# Patient Record
Sex: MALE | Race: BLACK OR AFRICAN AMERICAN | NOT HISPANIC OR LATINO | Employment: FULL TIME | URBAN - METROPOLITAN AREA
[De-identification: names, ages, dates, MRNs, and addresses within clinical notes are randomized per-mention and may not be internally consistent; named-entity substitution may affect disease eponyms.]

---

## 2020-05-11 ENCOUNTER — HISTORICAL (OUTPATIENT)
Dept: RADIOLOGY | Facility: HOSPITAL | Age: 50
End: 2020-05-11

## 2022-01-18 ENCOUNTER — HISTORICAL (OUTPATIENT)
Dept: SURGERY | Facility: HOSPITAL | Age: 52
End: 2022-01-18

## 2022-01-18 LAB
ABS NEUT (OLG): 5.6 X10(3)/MCL (ref 1.5–6.9)
ALBUMIN SERPL-MCNC: 4 GM/DL (ref 3.5–5)
ALBUMIN/GLOB SERPL: 1.2 RATIO (ref 1.1–2)
ALP SERPL-CCNC: 100 UNIT/L (ref 40–150)
ALT SERPL-CCNC: 39 UNIT/L (ref 0–55)
APTT PPP: 29.8 SEC (ref 23.4–34.9)
AST SERPL-CCNC: 27 UNIT/L (ref 5–34)
BASOPHILS # BLD AUTO: 0.1 X10(3)/MCL (ref 0–0.1)
BASOPHILS NFR BLD AUTO: 1 % (ref 0–1)
BILIRUB SERPL-MCNC: 0.4 MG/DL
BILIRUBIN DIRECT+TOT PNL SERPL-MCNC: 0.2 MG/DL (ref 0–0.5)
BILIRUBIN DIRECT+TOT PNL SERPL-MCNC: 0.2 MG/DL (ref 0–0.8)
BUN SERPL-MCNC: 12 MG/DL (ref 8.4–25.7)
CALCIUM SERPL-MCNC: 10 MG/DL (ref 8.7–10.5)
CHLORIDE SERPL-SCNC: 100 MMOL/L (ref 98–107)
CO2 SERPL-SCNC: 34 MMOL/L (ref 22–29)
CREAT SERPL-MCNC: 1.02 MG/DL (ref 0.73–1.18)
EOSINOPHIL # BLD AUTO: 0.5 X10(3)/MCL (ref 0–0.6)
EOSINOPHIL NFR BLD AUTO: 5 % (ref 0–5)
ERYTHROCYTE [DISTWIDTH] IN BLOOD BY AUTOMATED COUNT: 14 % (ref 11.5–17)
GLOBULIN SER-MCNC: 3.2 GM/DL (ref 2.4–3.5)
GLUCOSE SERPL-MCNC: 79 MG/DL (ref 74–100)
HCT VFR BLD AUTO: 46.4 % (ref 42–52)
HGB BLD-MCNC: 14.8 GM/DL (ref 14–18)
IMM GRANULOCYTES # BLD AUTO: 0.03 10*3/UL (ref 0–0.02)
IMM GRANULOCYTES NFR BLD AUTO: 0.3 % (ref 0–0.43)
INR PPP: 1 (ref 2–3)
LYMPHOCYTES # BLD AUTO: 1.9 X10(3)/MCL (ref 0.5–4.1)
LYMPHOCYTES NFR BLD AUTO: 22 % (ref 15–40)
MCH RBC QN AUTO: 27 PG (ref 27–34)
MCHC RBC AUTO-ENTMCNC: 32 GM/DL (ref 31–36)
MCV RBC AUTO: 85 FL (ref 80–99)
MONOCYTES # BLD AUTO: 0.8 X10(3)/MCL (ref 0–1.1)
MONOCYTES NFR BLD AUTO: 9 % (ref 4–12)
NEUTROPHILS # BLD AUTO: 5.6 X10(3)/MCL (ref 1.5–6.9)
NEUTROPHILS NFR BLD AUTO: 63 % (ref 43–75)
PLATELET # BLD AUTO: 248 X10(3)/MCL (ref 140–400)
PMV BLD AUTO: 11.8 FL (ref 6.8–10)
POTASSIUM SERPL-SCNC: 4 MMOL/L (ref 3.5–5.1)
PROT SERPL-MCNC: 7.2 GM/DL (ref 6.4–8.3)
PROTHROMBIN TIME: 12.7 SEC (ref 11.7–14.5)
RBC # BLD AUTO: 5.47 X10(6)/MCL (ref 4.7–6.1)
SODIUM SERPL-SCNC: 140 MMOL/L (ref 136–145)
WBC # SPEC AUTO: 8.8 X10(3)/MCL (ref 4.5–11.5)

## 2022-01-20 ENCOUNTER — HISTORICAL (OUTPATIENT)
Dept: ANESTHESIOLOGY | Facility: HOSPITAL | Age: 52
End: 2022-01-20

## 2022-04-12 ENCOUNTER — HISTORICAL (OUTPATIENT)
Dept: ADMINISTRATIVE | Facility: HOSPITAL | Age: 52
End: 2022-04-12
Payer: MEDICAID

## 2022-04-30 VITALS
HEIGHT: 73 IN | OXYGEN SATURATION: 100 % | SYSTOLIC BLOOD PRESSURE: 127 MMHG | DIASTOLIC BLOOD PRESSURE: 90 MMHG | WEIGHT: 181.69 LBS | BODY MASS INDEX: 24.08 KG/M2

## 2022-06-18 ENCOUNTER — HOSPITAL ENCOUNTER (EMERGENCY)
Facility: HOSPITAL | Age: 52
Discharge: HOME | End: 2022-06-18
Attending: INTERNAL MEDICINE
Payer: MEDICAID

## 2022-06-18 VITALS
WEIGHT: 176.38 LBS | OXYGEN SATURATION: 100 % | HEIGHT: 73 IN | HEART RATE: 64 BPM | SYSTOLIC BLOOD PRESSURE: 131 MMHG | TEMPERATURE: 98 F | DIASTOLIC BLOOD PRESSURE: 85 MMHG | RESPIRATION RATE: 14 BRPM | BODY MASS INDEX: 23.37 KG/M2

## 2022-06-18 DIAGNOSIS — R21 RASH: Primary | ICD-10-CM

## 2022-06-18 PROCEDURE — 99284 EMERGENCY DEPT VISIT MOD MDM: CPT

## 2022-06-18 RX ORDER — PREDNISONE 10 MG/1
10 TABLET ORAL DAILY
Qty: 21 TABLET | Refills: 0 | OUTPATIENT
Start: 2022-06-18 | End: 2022-12-26

## 2022-06-18 RX ORDER — CHLORTHALIDONE 25 MG/1
25 TABLET ORAL
COMMUNITY
Start: 2022-01-18

## 2022-06-18 RX ORDER — CETIRIZINE HYDROCHLORIDE 10 MG/1
10 TABLET ORAL DAILY
Qty: 7 TABLET | Refills: 0 | OUTPATIENT
Start: 2022-06-18 | End: 2023-05-18

## 2022-06-18 RX ORDER — HYDROXYZINE PAMOATE 25 MG/1
25 CAPSULE ORAL EVERY 8 HOURS PRN
Qty: 21 CAPSULE | Refills: 0 | Status: SHIPPED | OUTPATIENT
Start: 2022-06-18 | End: 2022-06-25

## 2022-06-18 RX ORDER — AMLODIPINE BESYLATE 5 MG/1
TABLET ORAL
COMMUNITY
Start: 2021-08-18

## 2022-06-18 RX ORDER — BUPROPION HYDROCHLORIDE 150 MG/1
1 TABLET ORAL DAILY
COMMUNITY

## 2022-06-18 NOTE — ED PROVIDER NOTES
Encounter Date: 6/18/2022       History     Chief Complaint   Patient presents with    Rash     Pt reports skin irritation on bilateral hands x 3 days.      patient reports an itchy rash to his hands for the past x2 weeks; pt reports frequent hand washing and wearing of gloves daily    The history is provided by the patient.   Rash   The current episode started several weeks ago. The problem has been unchanged. The problem is associated with nothing. The rash is present on the right hand and left hand. The pain is at a severity of 0/10. The pain has been constant since onset. Pertinent negatives include no blisters, no itching, no pain and no weeping. He has tried nothing for the symptoms. The treatment provided no relief.     Review of patient's allergies indicates:  No Known Allergies  History reviewed. No pertinent past medical history.  History reviewed. No pertinent surgical history.  History reviewed. No pertinent family history.  Social History     Tobacco Use    Smoking status: Light Tobacco Smoker    Smokeless tobacco: Never Used   Substance Use Topics    Alcohol use: Never    Drug use: Never     Review of Systems   Constitutional: Negative for fever.   HENT: Negative for sore throat.    Respiratory: Negative for shortness of breath.    Cardiovascular: Negative for chest pain.   Gastrointestinal: Negative for nausea.   Genitourinary: Negative for dysuria.   Musculoskeletal: Negative for back pain.   Skin: Positive for rash. Negative for itching.   Neurological: Negative for weakness.   Hematological: Does not bruise/bleed easily.   Psychiatric/Behavioral: Negative.        Physical Exam     Initial Vitals [06/18/22 1612]   BP Pulse Resp Temp SpO2   130/88 80 16 98.2 °F (36.8 °C) 100 %      MAP       --         Physical Exam    Nursing note and vitals reviewed.  Constitutional: He appears well-developed.   HENT:   Head: Atraumatic.   Eyes: Conjunctivae are normal.   Neck:   Normal range of  motion.  Cardiovascular: Normal rate and regular rhythm.   Pulmonary/Chest: Breath sounds normal. He exhibits no tenderness.   Abdominal: Abdomen is soft. Bowel sounds are normal. There is no abdominal tenderness.   Musculoskeletal:         General: Normal range of motion.      Cervical back: Normal range of motion.     Neurological: He is alert and oriented to person, place, and time.   Skin: Skin is warm. Rash noted. No cyanosis. No pallor. Nails show no clubbing.   Similar appearance to dyshidrotic eczema   Psychiatric: He has a normal mood and affect. His behavior is normal. Judgment and thought content normal.         ED Course   Procedures  Labs Reviewed - No data to display       Imaging Results    None          Medications - No data to display                       Clinical Impression:   Final diagnoses:  [R21] Rash (Primary)          ED Disposition Condition    Discharge Stable        ED Prescriptions     Medication Sig Dispense Start Date End Date Auth. Provider    hydrOXYzine pamoate (VISTARIL) 25 MG Cap Take 1 capsule (25 mg total) by mouth every 8 (eight) hours as needed (itching). 21 capsule 6/18/2022 6/25/2022 JAGJIT Jones    predniSONE (DELTASONE) 10 MG tablet Take 1 tablet (10 mg total) by mouth once daily. Take 4 tabs x 3 days, then Take 2 tabs x 3 days, then Take 1 tab x 3 days. 21 tablet 6/18/2022  JAGJIT Jones    cetirizine (ZYRTEC) 10 MG tablet Take 1 tablet (10 mg total) by mouth once daily. for 7 days 7 tablet 6/18/2022 6/25/2022 JAGJIT Jones        Follow-up Information     Follow up With Specialties Details Why Contact Info    discharge followup    If your symptoms become WORSE or you DO NOT IMPROVE and you are unable to reach your health care provider, you should RETURN to the emergency department    discharge info    Discussed all pertinent ED information, results, diagnosis and treatment plan; All questions and concerns were addressed at this time. Patient voices  understanding of information and instructions. Patient is comfortable with plan and discharge           JAGJIT Jones  06/18/22 2915

## 2022-12-06 ENCOUNTER — HOSPITAL ENCOUNTER (EMERGENCY)
Facility: HOSPITAL | Age: 52
Discharge: HOME | End: 2022-12-06
Attending: EMERGENCY MEDICINE
Payer: MEDICAID

## 2022-12-06 VITALS
HEART RATE: 80 BPM | SYSTOLIC BLOOD PRESSURE: 128 MMHG | OXYGEN SATURATION: 99 % | DIASTOLIC BLOOD PRESSURE: 78 MMHG | HEIGHT: 73 IN | BODY MASS INDEX: 21.48 KG/M2 | RESPIRATION RATE: 20 BRPM | TEMPERATURE: 98 F | WEIGHT: 162.06 LBS

## 2022-12-06 DIAGNOSIS — M54.42 ACUTE LEFT-SIDED LOW BACK PAIN WITH LEFT-SIDED SCIATICA: Primary | ICD-10-CM

## 2022-12-06 PROCEDURE — 96372 THER/PROPH/DIAG INJ SC/IM: CPT | Performed by: PHYSICIAN ASSISTANT

## 2022-12-06 PROCEDURE — 63600175 PHARM REV CODE 636 W HCPCS: Performed by: PHYSICIAN ASSISTANT

## 2022-12-06 PROCEDURE — 99284 EMERGENCY DEPT VISIT MOD MDM: CPT

## 2022-12-06 RX ORDER — KETOROLAC TROMETHAMINE 10 MG/1
10 TABLET, FILM COATED ORAL EVERY 6 HOURS
Qty: 20 TABLET | Refills: 0 | Status: SHIPPED | OUTPATIENT
Start: 2022-12-06 | End: 2022-12-11

## 2022-12-06 RX ORDER — KETOROLAC TROMETHAMINE 30 MG/ML
60 INJECTION, SOLUTION INTRAMUSCULAR; INTRAVENOUS
Status: COMPLETED | OUTPATIENT
Start: 2022-12-06 | End: 2022-12-06

## 2022-12-06 RX ADMIN — KETOROLAC TROMETHAMINE 60 MG: 30 INJECTION, SOLUTION INTRAMUSCULAR; INTRAVENOUS at 03:12

## 2022-12-06 NOTE — ED PROVIDER NOTES
Encounter Date: 12/6/2022       History     Chief Complaint   Patient presents with    Hip Pain     Left hip pain radiating down the leg x 3 weeks     53 YO AAM in ER with complaints of left lower back pain going into left buttocks and the back of his thigh X 3 week. No injury/trauma. Denies fever, chills, chest pain, SOB, abdominal pain, N/V/D, HA or dizziness. No other complaints.     The history is provided by the patient.   Review of patient's allergies indicates:  No Known Allergies  Past Medical History:   Diagnosis Date    Hypertension      History reviewed. No pertinent surgical history.  History reviewed. No pertinent family history.  Social History     Tobacco Use    Smoking status: Light Smoker    Smokeless tobacco: Never   Substance Use Topics    Alcohol use: Never    Drug use: Never     Review of Systems   Constitutional:  Negative for chills and fever.   HENT:  Negative for congestion and trouble swallowing.    Respiratory:  Negative for shortness of breath and wheezing.    Cardiovascular:  Negative for chest pain and leg swelling.   Gastrointestinal:  Negative for abdominal pain, diarrhea, nausea and vomiting.   Musculoskeletal:  Positive for back pain. Negative for joint swelling.   Skin:  Negative for rash.   Neurological:  Negative for dizziness, weakness and headaches.   All other systems reviewed and are negative.    Physical Exam     Initial Vitals [12/06/22 1517]   BP Pulse Resp Temp SpO2   131/84 78 16 98.1 °F (36.7 °C) 98 %      MAP       --         Physical Exam    Nursing note and vitals reviewed.  Constitutional: He appears well-developed and well-nourished.   HENT:   Head: Normocephalic and atraumatic.   Mouth/Throat: Oropharynx is clear and moist.   Eyes: Conjunctivae are normal.   Neck: Neck supple.   Normal range of motion.  Cardiovascular:  Normal rate, regular rhythm and intact distal pulses.           Pulmonary/Chest: Breath sounds normal.   Abdominal: Abdomen is soft.    Musculoskeletal:         General: No edema.      Cervical back: Normal range of motion and neck supple.      Lumbar back: Tenderness present. No swelling, edema, signs of trauma, lacerations or bony tenderness. Decreased range of motion.        Back:       Left lower leg: Normal.      Comments: Ttp in left sciatica distribution     Neurological: He is alert and oriented to person, place, and time.   Skin: Skin is dry.   Psychiatric: He has a normal mood and affect.       ED Course   Procedures  Labs Reviewed - No data to display       Imaging Results    None          Medications   ketorolac injection 60 mg (60 mg Intramuscular Given 12/6/22 1548)                 ED Course as of 12/06/22 1648   Tue Dec 06, 2022   1648 VSS, NAD, pt is non-toxic or ill appearing, feeling better after treatment in ER, treatment plan and discharge instructions including follow up discussed, pt verbalized understanding, all questions answered, pt is stable and ready for discharge  [TT]      ED Course User Index  [TT] JAGJIT Evans                 Clinical Impression:   Final diagnoses:  [M54.42] Acute left-sided low back pain with left-sided sciatica (Primary)      ED Disposition Condition    Discharge Stable          ED Prescriptions       Medication Sig Dispense Start Date End Date Auth. Provider    ketorolac (TORADOL) 10 mg tablet Take 1 tablet (10 mg total) by mouth every 6 (six) hours. for 5 days 20 tablet 12/6/2022 12/11/2022 JAGJIT Evans          Follow-up Information       Follow up With Specialties Details Why Contact Info [facility] Emergency Medicine In 3 days As needed, If symptoms worsen [address] [facility] [address] [phone]    Primary Care Provider  Schedule an appointment as soon as possible for a visit in 5 days  Call a PCP to make an appointment for follow up within 3-5  days.  You can all the phone number on the back of your insurance card for accepting  providers as discussed.             JAGJIT Evans  12/06/22 0487

## 2022-12-06 NOTE — DISCHARGE INSTRUCTIONS
Take all medications as prescribed.     Follow up with your PCP in 3-5 days.     Return to ER for any changes or worsening of symptoms.

## 2022-12-26 ENCOUNTER — HOSPITAL ENCOUNTER (EMERGENCY)
Facility: HOSPITAL | Age: 52
Discharge: HOME | End: 2022-12-26
Attending: STUDENT IN AN ORGANIZED HEALTH CARE EDUCATION/TRAINING PROGRAM
Payer: MEDICAID

## 2022-12-26 VITALS
BODY MASS INDEX: 20.92 KG/M2 | OXYGEN SATURATION: 100 % | RESPIRATION RATE: 18 BRPM | DIASTOLIC BLOOD PRESSURE: 88 MMHG | HEART RATE: 99 BPM | TEMPERATURE: 99 F | HEIGHT: 73 IN | SYSTOLIC BLOOD PRESSURE: 129 MMHG | WEIGHT: 157.88 LBS

## 2022-12-26 DIAGNOSIS — M25.512 ACUTE PAIN OF BOTH SHOULDERS: Primary | ICD-10-CM

## 2022-12-26 DIAGNOSIS — M25.511 ACUTE PAIN OF BOTH SHOULDERS: Primary | ICD-10-CM

## 2022-12-26 PROCEDURE — 99284 EMERGENCY DEPT VISIT MOD MDM: CPT

## 2022-12-26 PROCEDURE — 96372 THER/PROPH/DIAG INJ SC/IM: CPT | Performed by: PHYSICIAN ASSISTANT

## 2022-12-26 PROCEDURE — 63600175 PHARM REV CODE 636 W HCPCS: Performed by: PHYSICIAN ASSISTANT

## 2022-12-26 RX ORDER — METHYLPREDNISOLONE SOD SUCC 125 MG
125 VIAL (EA) INJECTION
Status: COMPLETED | OUTPATIENT
Start: 2022-12-26 | End: 2022-12-26

## 2022-12-26 RX ORDER — DICLOFENAC SODIUM 75 MG/1
75 TABLET, DELAYED RELEASE ORAL 2 TIMES DAILY PRN
Qty: 30 TABLET | Refills: 0 | Status: SHIPPED | OUTPATIENT
Start: 2022-12-26 | End: 2022-12-31

## 2022-12-26 RX ORDER — METHOCARBAMOL 500 MG/1
500 TABLET, FILM COATED ORAL 3 TIMES DAILY
Qty: 30 TABLET | Refills: 0 | Status: SHIPPED | OUTPATIENT
Start: 2022-12-26 | End: 2022-12-31

## 2022-12-26 RX ORDER — KETOROLAC TROMETHAMINE 30 MG/ML
30 INJECTION, SOLUTION INTRAMUSCULAR; INTRAVENOUS
Status: COMPLETED | OUTPATIENT
Start: 2022-12-26 | End: 2022-12-26

## 2022-12-26 RX ORDER — PREDNISONE 20 MG/1
20 TABLET ORAL DAILY
Qty: 3 TABLET | Refills: 0 | Status: SHIPPED | OUTPATIENT
Start: 2022-12-26 | End: 2022-12-29

## 2022-12-26 RX ADMIN — METHYLPREDNISOLONE SODIUM SUCCINATE 125 MG: 125 INJECTION, POWDER, FOR SOLUTION INTRAMUSCULAR; INTRAVENOUS at 12:12

## 2022-12-26 RX ADMIN — KETOROLAC TROMETHAMINE 30 MG: 30 INJECTION, SOLUTION INTRAMUSCULAR; INTRAVENOUS at 12:12

## 2022-12-26 NOTE — ED PROVIDER NOTES
Encounter Date: 12/26/2022       History     Chief Complaint   Patient presents with    Shoulder Pain     Bilateral shoulder and lower back pain that started on Saturday. Worse with lifting arms and bending at the waist. 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      The history is provided by the patient. No  was used.   Back Pain   This is a recurrent problem. The current episode started two days ago. The problem occurs throughout the day. The problem has been unchanged. The pain is associated with no known injury (lifts heavy at work). The pain is present in the lumbar spine. The pain is at a severity of 10/10. The symptoms are aggravated by bending, twisting and certain positions. Pertinent negatives include no chest pain, no fever, no numbness, no headaches, no abdominal pain, no bowel incontinence, no perianal numbness, no bladder incontinence, no dysuria, no paresthesias, no paresis and no weakness.   Review of patient's allergies indicates:  No Known Allergies  Past Medical History:   Diagnosis Date    Hypertension      No past surgical history on file.  No family history on file.  Social History     Tobacco Use    Smoking status: Light Smoker    Smokeless tobacco: Never   Substance Use Topics    Alcohol use: Never    Drug use: Never     Review of Systems   Constitutional:  Negative for chills and fever.   Respiratory:  Negative for cough and shortness of breath.    Cardiovascular:  Negative for chest pain and palpitations.   Gastrointestinal:  Negative for abdominal pain, bowel incontinence, nausea and vomiting.   Genitourinary:  Negative for bladder incontinence, dysuria and flank pain.   Musculoskeletal:  Positive for back pain (bi lower, worse on left). Negative for neck pain.        Bi shoulder pain     Skin:  Negative for rash and wound.   Neurological:  Negative for dizziness, weakness, light-headedness, numbness, headaches and paresthesias.   Hematological:   Negative for adenopathy. Does not bruise/bleed easily.     Physical Exam     Initial Vitals [12/26/22 1133]   BP Pulse Resp Temp SpO2   129/88 99 18 98.7 °F (37.1 °C) 100 %      MAP       --         Physical Exam    Nursing note and vitals reviewed.  Constitutional: He appears well-developed and well-nourished.   HENT:   Nose: Nose normal.   Mouth/Throat: Oropharynx is clear and moist.   Eyes: Conjunctivae are normal.   Neck: Neck supple.   Normal range of motion.  Cardiovascular:  Normal rate, normal heart sounds and intact distal pulses.           Pulmonary/Chest: Breath sounds normal.   Abdominal: Abdomen is soft. Bowel sounds are normal. There is no abdominal tenderness.   Musculoskeletal:         General: Normal range of motion.      Cervical back: Normal range of motion and neck supple. No bony tenderness.      Thoracic back: No bony tenderness.      Lumbar back: Spasms and tenderness (muscular) present. No bony tenderness.        Back:      Lymphadenopathy:     He has no cervical adenopathy.   Neurological: He is alert. GCS eye subscore is 4. GCS verbal subscore is 5. GCS motor subscore is 6.   Skin: Skin is warm. Capillary refill takes less than 2 seconds.       ED Course   Procedures  Labs Reviewed - No data to display       Imaging Results              X-Ray Shoulder 2 or more views Bilat (Final result)  Result time 12/26/22 13:03:09      Final result by Tony Crowder MD (12/26/22 13:03:09)                   Impression:      Acromioclavicular degenerative osteoarthritic changes.      Electronically signed by: Tony Crowder  Date:    12/26/2022  Time:    13:03               Narrative:    EXAMINATION:  XR SHOULDER COMPLETE 2 OR MORE VIEWS BILATERAL    CLINICAL HISTORY:  bi shoulder pain;    TECHNIQUE:  Three views    COMPARISON:  None available    FINDINGS:  There are mild degenerative osteoarthritic changes which involve the acromioclavicular joint.  Glenohumeral articulation is unremarkable.  Articular  surfaces alignment is preserved and there is no intrinsic osseous abnormality.  No acute fracture or dislocation.  There is no soft tissue calcification.                                       X-Ray Lumbar Spine Ap And Lateral (Final result)  Result time 12/26/22 13:11:31      Final result by Tony Crowder MD (12/26/22 13:11:31)                   Impression:      Mild degenerative changes.      Electronically signed by: Tony Crowder  Date:    12/26/2022  Time:    13:11               Narrative:    EXAMINATION:  XR LUMBAR SPINE AP AND LATERAL    CLINICAL HISTORY:  lumbar back pain;    TECHNIQUE:  Two-view    COMPARISON:  May 11, 2020    FINDINGS:  There are mild intervertebral disc space degenerative changes.  Minimal anterior degenerative hypertrophic spurrings.  There is lower lumbar spine facet arthropathy.  No acute fracture or dislocation.    Right mid peripheral abdomen radiopacity which on the lateral radiograph projects at the level of L2-L3 disc space is in similar location.  On the previous radiographs, this was not included on the anterior view.  Please correlate clinically.                                       Medications   ketorolac injection 30 mg (30 mg Intramuscular Given 12/26/22 1202)   methylPREDNISolone sodium succinate injection 125 mg (125 mg Intramuscular Given 12/26/22 1202)           APC / Resident Notes:   I was not physically present during the history, exam or disposition of this patient. I was available at all times for consultation. (Zmora)      ED Course as of 12/26/22 2021   Mon Dec 26, 2022   1320 X-Ray Lumbar Spine Ap And Lateral  Mild degenerative changes.    [ER]   1320 X-Ray Shoulder 2 or more views Bilat  Acromioclavicular degenerative osteoarthritic changes.    [ER]      ED Course User Index  [ER] JAGJIT Khalil                 Clinical Impression:   Final diagnoses:  [M25.511, M25.512] Acute pain of both shoulders (Primary)        ED Disposition Condition    Discharge  Stable          ED Prescriptions       Medication Sig Dispense Start Date End Date Auth. Provider    diclofenac (VOLTAREN) 75 MG EC tablet Take 1 tablet (75 mg total) by mouth 2 (two) times daily as needed (pain). Do not take this with Advil, Ibuprofen, Motrin, Asprin, or Toradol. 30 tablet 12/26/2022 12/31/2022 JAGJIT Khalil    methocarbamoL (ROBAXIN) 500 MG Tab Take 1 tablet (500 mg total) by mouth 3 (three) times daily. For muscle spasm for 5 days 30 tablet 12/26/2022 12/31/2022 JAGJIT Khalil    predniSONE (DELTASONE) 20 MG tablet Take 1 tablet (20 mg total) by mouth once daily. for 3 days 3 tablet 12/26/2022 12/29/2022 JAGJIT Khalil          Follow-up Information       Follow up With Specialties Details Why Contact Info    [facility] Emergency Medicine  As needed, If symptoms worsen [address] [facility] [id number] [phone]             William Beal MD  12/26/22 2021

## 2022-12-26 NOTE — Clinical Note
"Jose F Guanry" Kory was seen and treated in our emergency department on 12/26/2022.  He may return to work on 12/28/2022.       If you have any questions or concerns, please don't hesitate to call.      JAGJIT Khalil"

## 2022-12-26 NOTE — DISCHARGE INSTRUCTIONS
Take medications as prescribed as needed for pain with food and plenty of water.   Apply heating pad to sore muscles ( being careful not to burn skin).  Soak in warm epsom salt baths for 20-30 minutes daily to help with sore and aching muscles.  Avoid lifting heavy.  Follow up with your primary care provider within 3-5 days.

## 2023-01-31 ENCOUNTER — HOSPITAL ENCOUNTER (EMERGENCY)
Facility: HOSPITAL | Age: 53
Discharge: HOME | End: 2023-01-31
Attending: FAMILY MEDICINE
Payer: MEDICAID

## 2023-01-31 VITALS
RESPIRATION RATE: 16 BRPM | HEART RATE: 75 BPM | OXYGEN SATURATION: 100 % | TEMPERATURE: 98 F | WEIGHT: 163.13 LBS | HEIGHT: 73 IN | DIASTOLIC BLOOD PRESSURE: 91 MMHG | SYSTOLIC BLOOD PRESSURE: 134 MMHG | BODY MASS INDEX: 21.62 KG/M2

## 2023-01-31 DIAGNOSIS — S16.1XXA CERVICAL STRAIN, ACUTE, INITIAL ENCOUNTER: Primary | ICD-10-CM

## 2023-01-31 PROCEDURE — 99284 EMERGENCY DEPT VISIT MOD MDM: CPT

## 2023-01-31 PROCEDURE — 96372 THER/PROPH/DIAG INJ SC/IM: CPT | Performed by: NURSE PRACTITIONER

## 2023-01-31 PROCEDURE — 63600175 PHARM REV CODE 636 W HCPCS: Performed by: NURSE PRACTITIONER

## 2023-01-31 RX ORDER — INDOMETHACIN 25 MG/1
25 CAPSULE ORAL 2 TIMES DAILY PRN
Qty: 14 CAPSULE | Refills: 0 | OUTPATIENT
Start: 2023-01-31 | End: 2023-02-02

## 2023-01-31 RX ORDER — BACLOFEN 10 MG/1
10 TABLET ORAL 3 TIMES DAILY PRN
Qty: 21 TABLET | Refills: 0 | OUTPATIENT
Start: 2023-01-31 | End: 2023-02-02

## 2023-01-31 RX ORDER — KETOROLAC TROMETHAMINE 30 MG/ML
30 INJECTION, SOLUTION INTRAMUSCULAR; INTRAVENOUS
Status: COMPLETED | OUTPATIENT
Start: 2023-01-31 | End: 2023-01-31

## 2023-01-31 RX ADMIN — KETOROLAC TROMETHAMINE 30 MG: 30 INJECTION, SOLUTION INTRAMUSCULAR; INTRAVENOUS at 12:01

## 2023-01-31 NOTE — Clinical Note
"Jose F"Jennifer Horn was seen and treated in our emergency department on 1/31/2023.  He may return to work on 02/03/2023.       If you have any questions or concerns, please don't hesitate to call.      Charanjit Ortega Jr., FNP"

## 2023-01-31 NOTE — ED PROVIDER NOTES
Encounter Date: 1/31/2023       History     Chief Complaint   Patient presents with    Neck Pain     PT W CO RECURRENT NECK PAIN W RAD TO RT SHOULDER X 3 DAYS.   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      Pt is a 52 y.o. male who presents to the [facility] ED complaining of Rt upper back and neck pain, Symptoms present x 3 days. Pt reports having a job which requires heavy lifting and he is concerned that he may have pulled a muscle in the area. Denies chest pain,SOBB, weakness, dizziness,abdominall pain, or loss of bowel or bladder control.     Review of patient's allergies indicates:  No Known Allergies  Past Medical History:   Diagnosis Date    Hypertension      History reviewed. No pertinent surgical history.  History reviewed. No pertinent family history.  Social History     Tobacco Use    Smoking status: Light Smoker    Smokeless tobacco: Never   Substance Use Topics    Alcohol use: Never    Drug use: Never     Review of Systems   Constitutional:  Negative for chills, diaphoresis, fatigue and fever.   HENT:  Negative for facial swelling, postnasal drip, rhinorrhea, sinus pressure, sinus pain, sore throat and trouble swallowing.    Respiratory:  Negative for cough, chest tightness, shortness of breath and wheezing.    Cardiovascular:  Negative for chest pain, palpitations and leg swelling.   Gastrointestinal:  Negative for abdominal pain, diarrhea, nausea and vomiting.   Genitourinary:  Negative for dysuria, flank pain, hematuria and urgency.   Musculoskeletal:  Positive for back pain and neck pain. Negative for arthralgias and myalgias.   Skin:  Negative for color change and rash.   Neurological:  Negative for dizziness, syncope, weakness and headaches.   Hematological:  Does not bruise/bleed easily.   All other systems reviewed and are negative.    Physical Exam     Initial Vitals [01/31/23 1226]   BP Pulse Resp Temp SpO2   (!) 134/91 75 16 97.9 °F (36.6 °C) 100 %      MAP       --         Physical Exam    Nursing  note and vitals reviewed.  Constitutional: Vital signs are normal. He appears well-developed and well-nourished.   HENT:   Head: Normocephalic.   Nose: Nose normal.   Mouth/Throat: Oropharynx is clear and moist.   Eyes: Conjunctivae and EOM are normal. Pupils are equal, round, and reactive to light.   Neck: Neck supple.   Normal range of motion.  Cardiovascular:  Normal rate, regular rhythm, normal heart sounds and intact distal pulses.           Pulmonary/Chest: Effort normal and breath sounds normal. No respiratory distress. He has no wheezes. He has no rhonchi. He has no rales. He exhibits no tenderness.   Abdominal: Abdomen is soft and flat. Bowel sounds are normal. There is no abdominal tenderness. There is no rebound, no guarding, no tenderness at McBurney's point and negative Pabon's sign.   Musculoskeletal:         General: Normal range of motion.      Cervical back: Normal range of motion and neck supple. Spasms and tenderness present. No swelling, edema, deformity, erythema or torticollis.        Back:      Neurological: He is alert and oriented to person, place, and time. He has normal strength.   Skin: Skin is warm and dry. Capillary refill takes less than 2 seconds.   Psychiatric: He has a normal mood and affect. His behavior is normal. Judgment and thought content normal.       ED Course   Procedures  Labs Reviewed - No data to display       Imaging Results    None          Medications   ketorolac injection 30 mg (has no administration in time range)     Medical Decision Making:   Differential Diagnosis:   Muscle strain  ED Management:  12:36 PM Reassessed patient at this time. Reports condition has improved. Discussed with patient all pertinent ED information and results. Discussed diagnosis and treatment plan with patient. Follow up instructions and return to ED instruction have been given. All questions and concerns were addressed at this time. Patient voices understanding of information and  instructions. Patient is comfortable with plan and discharge. Patient is stable for discharge.                           Clinical Impression:   Final diagnoses:  [S16.1XXA] Cervical strain, acute, initial encounter (Primary)        ED Disposition Condition    Discharge Stable          ED Prescriptions       Medication Sig Dispense Start Date End Date Auth. Provider    indomethacin (INDOCIN) 25 MG capsule Take 1 capsule (25 mg total) by mouth 2 (two) times daily as needed (pain). 14 capsule 1/31/2023 2/7/2023 ANNIE Lafleur Jr.    baclofen (LIORESAL) 10 MG tablet Take 1 tablet (10 mg total) by mouth 3 (three) times daily as needed (muscle spasms). 21 tablet 1/31/2023 2/7/2023 ANNIE Lafleur Jr.          Follow-up Information       Follow up With Specialties Details Why Contact Info    [facility] Emergency Medicine In 3 days As needed, If symptoms worsen [address] [facility] [place] [id number] [phone]             ANNIE Lafleur Jr.  01/31/23 1238

## 2023-02-02 ENCOUNTER — HOSPITAL ENCOUNTER (EMERGENCY)
Facility: HOSPITAL | Age: 53
Discharge: HOME | End: 2023-02-02
Attending: STUDENT IN AN ORGANIZED HEALTH CARE EDUCATION/TRAINING PROGRAM
Payer: MEDICAID

## 2023-02-02 VITALS
OXYGEN SATURATION: 100 % | DIASTOLIC BLOOD PRESSURE: 91 MMHG | RESPIRATION RATE: 18 BRPM | WEIGHT: 159.63 LBS | BODY MASS INDEX: 21.16 KG/M2 | SYSTOLIC BLOOD PRESSURE: 135 MMHG | HEIGHT: 73 IN | TEMPERATURE: 98 F | HEART RATE: 83 BPM

## 2023-02-02 DIAGNOSIS — M62.838 NECK MUSCLE SPASM: Primary | ICD-10-CM

## 2023-02-02 PROCEDURE — 99284 EMERGENCY DEPT VISIT MOD MDM: CPT

## 2023-02-02 RX ORDER — DICLOFENAC SODIUM 75 MG/1
75 TABLET, DELAYED RELEASE ORAL 2 TIMES DAILY
Qty: 20 TABLET | Refills: 0 | Status: SHIPPED | OUTPATIENT
Start: 2023-02-02

## 2023-02-02 RX ORDER — TIZANIDINE 4 MG/1
4 TABLET ORAL EVERY 8 HOURS PRN
Qty: 30 TABLET | Refills: 0 | Status: SHIPPED | OUTPATIENT
Start: 2023-02-02 | End: 2023-02-12

## 2023-02-02 NOTE — Clinical Note
"Jos eF Guanry" Kory was seen and treated in our emergency department on 2/2/2023.  He may return to work on 02/05/2023.       If you have any questions or concerns, please don't hesitate to call.      GERTRUDE Kirby RN    "

## 2023-02-02 NOTE — Clinical Note
"Jose F Guanry" Kory was seen and treated in our emergency department on 2/2/2023.  He may return to work on 02/05/2023.       If you have any questions or concerns, please don't hesitate to call.      GERTRUDE Kirby RN    "

## 2023-02-03 NOTE — DISCHARGE INSTRUCTIONS
Take all medications as prescribed.     Drink plenty of fluids and get a lot of rest.     Make a rice sock at home for a hot pack.     Return to ER for any changes or worsening of symptoms.

## 2023-02-04 NOTE — ED PROVIDER NOTES
Encounter Date: 2/2/2023       History     Chief Complaint   Patient presents with    Neck Pain     C/o continued pain in neck since last ER visit. States meds not giving relief.      51 YO AAM in ER with complaints of continued neck pain X several days after doing some heavy lifting at work. He was seen in ER previously for same and states the Rx meds he was given are not helping. No new injury/trauma. Denies fever, chills, chest pain, SOB, abdominal pain, N/V/D, HA or dizziness. No other complaints.     The history is provided by the patient.   Review of patient's allergies indicates:  No Known Allergies  Past Medical History:   Diagnosis Date    Hypertension      History reviewed. No pertinent surgical history.  History reviewed. No pertinent family history.  Social History     Tobacco Use    Smoking status: Light Smoker    Smokeless tobacco: Never   Substance Use Topics    Alcohol use: Never    Drug use: Never     Review of Systems   Constitutional:  Negative for chills and fever.   HENT:  Negative for congestion and trouble swallowing.    Respiratory:  Negative for shortness of breath and wheezing.    Cardiovascular:  Negative for chest pain and leg swelling.   Gastrointestinal:  Negative for abdominal pain, diarrhea, nausea and vomiting.   Musculoskeletal:  Positive for myalgias, neck pain and neck stiffness. Negative for joint swelling.   Skin:  Negative for rash.   Neurological:  Negative for dizziness, weakness and headaches.   All other systems reviewed and are negative.    Physical Exam     Initial Vitals [02/02/23 1734]   BP Pulse Resp Temp SpO2   (!) 135/91 83 20 97.5 °F (36.4 °C) 100 %      MAP       --         Physical Exam    Nursing note and vitals reviewed.  Constitutional: He appears well-developed and well-nourished.   HENT:   Head: Normocephalic and atraumatic.   Mouth/Throat: Oropharynx is clear and moist.   Eyes: Conjunctivae are normal.   Neck: Trachea normal. Neck supple. No Brudzinski's  sign and no Kernig's sign noted.       Muscle spasms noted in trapezius muscle bilaterally, decreased ROM due to pain   Cardiovascular:  Normal rate, regular rhythm and intact distal pulses.           Pulmonary/Chest: Breath sounds normal.   Musculoskeletal:      Cervical back: Neck supple. No rigidity. Muscular tenderness present. No spinous process tenderness. Decreased range of motion.     Neurological: He is alert and oriented to person, place, and time.   Skin: Skin is dry.   Psychiatric: He has a normal mood and affect.       ED Course   Procedures  Labs Reviewed - No data to display       Imaging Results    None          Medications - No data to display  Medical Decision Making:   History:   Old Medical Records: I decided to obtain old medical records.  Old Records Summarized: other records.       <> Summary of Records: Previous ER visit reviewed, pt given Rx for baclofen and indomethacin, no imaging indicated  ED Management:  Will change Rx meds and have pt follow up with a PCP, he verbalized understanding and is in agreement     APC / Resident Notes:   I was not physically present during the history, exam or disposition of this patient. I was available at all times for consultation. (Zmora)                   Clinical Impression:   Final diagnoses:  [M62.838] Neck muscle spasm (Primary)        ED Disposition Condition    Discharge Stable          ED Prescriptions       Medication Sig Dispense Start Date End Date Auth. Provider    diclofenac (VOLTAREN) 75 MG EC tablet Take 1 tablet (75 mg total) by mouth 2 (two) times daily. 20 tablet 2/2/2023 -- JAGJIT Evans    tiZANidine (ZANAFLEX) 4 MG tablet Take 1 tablet (4 mg total) by mouth every 8 (eight) hours as needed (muscle spasm). 30 tablet 2/2/2023 2/12/2023 JAGJIT Evans          Follow-up Information       Follow up With Specialties Details Why Contact Info    [facility]  Schedule an appointment as soon as possible for a visit in 3 days  [address]  [address] [id number]  [phone]      [facility] - Emergency Dept Emergency Medicine In 3 days As needed, If symptoms worsen [address] [facility] [id number] [phone]             JAGJIT Evans  02/04/23 1411       William Beal MD  02/05/23 2050

## 2023-03-09 ENCOUNTER — HOSPITAL ENCOUNTER (EMERGENCY)
Facility: HOSPITAL | Age: 53
Discharge: HOME | End: 2023-03-09
Attending: INTERNAL MEDICINE
Payer: MEDICAID

## 2023-03-09 VITALS
OXYGEN SATURATION: 100 % | BODY MASS INDEX: 21.33 KG/M2 | HEIGHT: 73 IN | DIASTOLIC BLOOD PRESSURE: 89 MMHG | WEIGHT: 160.94 LBS | TEMPERATURE: 98 F | HEART RATE: 81 BPM | RESPIRATION RATE: 16 BRPM | SYSTOLIC BLOOD PRESSURE: 121 MMHG

## 2023-03-09 DIAGNOSIS — M25.511 ACUTE PAIN OF RIGHT SHOULDER: Primary | ICD-10-CM

## 2023-03-09 PROCEDURE — 99283 EMERGENCY DEPT VISIT LOW MDM: CPT

## 2023-03-09 RX ORDER — TIZANIDINE 4 MG/1
4 TABLET ORAL EVERY 6 HOURS PRN
Qty: 20 TABLET | Refills: 0 | Status: SHIPPED | OUTPATIENT
Start: 2023-03-09 | End: 2023-03-19

## 2023-03-09 NOTE — ED PROVIDER NOTES
Encounter Date: 3/9/2023       History     Chief Complaint   Patient presents with    Shoulder Pain     Pt c/o right shoulder pain xmonth. Pt denies injury has been seen for the same      The patient presents with right shoulder pain.  The onset was 1 month ago.  The course/duration of symptoms is constant.  Type of injury: none known but reports frequent heavy lifting at work.  Location: right shoulder. Radiating pain: none. The character of symptoms is pain.  The degree of pain is moderate and with certain movements.  The degree of swelling is none.  The exacerbating factor is movement.  There are relieving factors including rest and immobilization.  Risk factors consist of none.  Prior episodes: occasional.  Therapy today: none.  Associated symptoms: none.  Additional history: none.         Review of patient's allergies indicates:  No Known Allergies  Past Medical History:   Diagnosis Date    Hypertension      History reviewed. No pertinent surgical history.  History reviewed. No pertinent family history.  Social History     Tobacco Use    Smoking status: Light Smoker    Smokeless tobacco: Never   Substance Use Topics    Alcohol use: Never    Drug use: Never     Review of Systems   Constitutional:  Negative for fever.   HENT:  Negative for sore throat.    Respiratory:  Negative for shortness of breath.    Cardiovascular:  Negative for chest pain.   Gastrointestinal:  Negative for nausea.   Genitourinary:  Negative for dysuria.   Musculoskeletal:  Positive for arthralgias. Negative for back pain.   Skin:  Negative for rash.   Neurological:  Negative for weakness.   Hematological:  Does not bruise/bleed easily.   All other systems reviewed and are negative.    Physical Exam     Initial Vitals [03/09/23 1251]   BP Pulse Resp Temp SpO2   129/89 84 18 98.3 °F (36.8 °C) 99 %      MAP       --         Physical Exam    Nursing note and vitals reviewed.  Constitutional: He appears well-developed and well-nourished.    HENT:   Head: Normocephalic and atraumatic.   Neck: Neck supple.   Normal range of motion.  Cardiovascular:  Normal rate, regular rhythm, normal heart sounds and intact distal pulses.           Pulmonary/Chest: Breath sounds normal.   Abdominal: Abdomen is soft. Bowel sounds are normal.   Musculoskeletal:         General: Normal range of motion.      Cervical back: Normal range of motion and neck supple.      Comments: Mild ttp right shoulder, decreased rom d/t pain, good distal pulses, NVI     Neurological: He is alert and oriented to person, place, and time. He has normal strength.   Skin: Skin is warm and dry.   Psychiatric: He has a normal mood and affect.       ED Course   Procedures  Labs Reviewed - No data to display       Imaging Results              X-ray Shoulder 2 or More Views Right (Final result)  Result time 03/09/23 15:46:45      Final result by Noemí Salvador MD (03/09/23 15:46:45)                   Impression:      No acute bony abnormality.      Electronically signed by: Noemí Salvador  Date:    03/09/2023  Time:    15:46               Narrative:    EXAMINATION:  XR SHOULDER COMPLETE 2 OR MORE VIEWS RIGHT    CLINICAL HISTORY:  right shoulder pain;    COMPARISON:  None.    FINDINGS:  There is no acute fracture or malalignment.  There are degenerative changes of the shoulder with joint space narrowing and osteophyte formation.  The soft tissues are unremarkable.                                       Medications - No data to display  Medical Decision Making:   History:   Old Records Summarized: records from clinic visits and records from previous admission(s).  Clinical Tests:   Radiological Study: Ordered and Reviewed  4:11 PM DISPOSITION: The patient is resting comfortably in no acute distress.  He is hemodynamically stable and is without objective evidence for acute process requiring urgent intervention or hospitalization. I provided counseling to patient with regard to condition, the  treatment plan, specific conditions for return, and the importance of follow up. Detailed written and verbal instructions provided to patient and he expressed a verbal understanding of the discharge instructions and overall management plan. Reiterated the importance of medication administration and safety and advised patient to follow up with primary care provider in 3-5 days or sooner if needed.  Answered questions at this time. The patient is stable for discharge.                           Clinical Impression:   Final diagnoses:  [M25.511] Acute pain of right shoulder (Primary)        ED Disposition Condition    Discharge Stable          ED Prescriptions       Medication Sig Dispense Start Date End Date Auth. Provider    tiZANidine (ZANAFLEX) 4 MG tablet Take 1 tablet (4 mg total) by mouth every 6 (six) hours as needed (pain or spasms). May cause drowsiness 20 tablet 3/9/2023 3/19/2023 MIKE Juarez          Follow-up Information       Follow up With Specialties Details Why Contact Info    [facility]  In 3 days  [address] [place] [address]  [phone]      [facility] - Emergency Dept Emergency Medicine  If symptoms worsen [address] [facility] [address] [phone]             MIKE Juarez  03/09/23 1615

## 2023-03-09 NOTE — Clinical Note
"Jose F Guanry" Kory was seen and treated in our emergency department on 3/9/2023.  He may return to work on 03/11/2023.       If you have any questions or concerns, please don't hesitate to call.      MIKE Juarez"

## 2023-03-15 ENCOUNTER — PATIENT OUTREACH (OUTPATIENT)
Dept: EMERGENCY MEDICINE | Facility: HOSPITAL | Age: 53
End: 2023-03-15
Payer: MEDICAID

## 2023-03-16 NOTE — PROGRESS NOTES
.Why Should I Have My Own Doctor or Nurse Practitioner (PCP) to Take Care of Me  What is a PCP (Primary Care Provider)?    A primary care provider is a doctor or nurse practitioner who you can call for an appointment and will see you when you are sick.    You will also be seen at scheduled appointment times during the year to check on your diabetes, or high blood pressure, or heart disease.    Why see the same PCP (doctor/nurse practitioner)?    You can be seen faster when you are sick           You, the PCP (doctor/nurse practitioner) and the office staff get to know each other; you begin to trust them to care for you. You take part in your health choices.   All of you together are a team.    Your medicine is looked at every time you visit, to be sure you are taking the medicine, as the PCP (doctor/nurse practitioner) ordered.    Your PCP (doctor/nurse practitioner) and their staff help keep you healthy and out of the hospital.  They can catch sicknesses earlier by ordering tests once a year to stop or prevent the sickness from getting worse.      Your PCP (doctor/nurse practitioner) can send you to providers who specialize (heart/bone/lung) if you need.  They and their office staff help keep track of your seeing other providers (doctors/nurse practitioners) and tests (CT/ MRIs/ X Rays)) taken.        PCPs want you to stay healthy.  Let us care for you.       .DASH Meal Plan  (Healthy eating plan)    Why use this meal plan?    This healthy meal plan lowers blood pressure.  This meal plan lowers the chance of you getting Diabetes, heart disease and stroke.  This meal plan also helps you lose weight.    DASH balanced meal plannin or more servings of fruits and vegetables every day.  At each meal, try to fill half of your plated with fruits and vegetables                    Eat 6-8 servings of whole grains every day.  Whole grains are:  Brown rice, oatmeal, whole wheat bread, whole grain, low salt cereals, Grace  bread, whole wheat flour tortillas                5 ounces of meat, chicken, or fish each day (3-ounce size of serving of meat is the same size as a deck of playing cards)  Fish like sardines, salmon and mackerel are also good for your heart.            2 servings of low-fat dairy each day (Milk, cottage cheese, yogurt)          Do Not:  Use More than 1,500 milligrams of salt a day if you have high blood pressure (2/3 teaspoon)    You would look at labels and total milligrams of salt per day        Do Not Add salt when cooking      Do Not Salt food before eating    Do Not Eat fried foods  Do Not Cook using butter, stick margarine, lard, shortening, coconut oil    Do Not Buy regular canned vegetables, pickles, or olives (too much salt; use low salt or no salt)  Do Not Do not buy premade or frozen meals    Do Not Eat fast food/buffet           Speak with your Doctor/Nurse practitioner about exercising 30 minutes a day          Modified from DASH eating plan education   .        What Do I Do If I Wake Up Sick                                                     If you wake up sick, or you start to fill sick during the day, try these tips to get care and start to feel better soon.                                                                                                                              As soon as you start to feel bad, call your doctor's office and ask for same day or next day visit appointment.        If you cannot be seen with your doctor's office within 24 hours, you can go to an urgent care and be seen.          How to stay well:     Take your medicine as ordered by your doctor      Fill your Medicine before you run out      Exercise       Enjoy walking in the sunlight daily  Keep your scheduled clinic appointments      Keep you scheduled yearly wellness visits

## 2023-04-17 ENCOUNTER — PATIENT OUTREACH (OUTPATIENT)
Dept: EMERGENCY MEDICINE | Facility: HOSPITAL | Age: 53
End: 2023-04-17
Payer: MEDICAID

## 2023-05-18 ENCOUNTER — HOSPITAL ENCOUNTER (EMERGENCY)
Facility: HOSPITAL | Age: 53
Discharge: HOME | End: 2023-05-18
Attending: EMERGENCY MEDICINE
Payer: MEDICAID

## 2023-05-18 VITALS
HEIGHT: 73 IN | HEART RATE: 83 BPM | WEIGHT: 162.13 LBS | SYSTOLIC BLOOD PRESSURE: 114 MMHG | BODY MASS INDEX: 21.49 KG/M2 | OXYGEN SATURATION: 100 % | DIASTOLIC BLOOD PRESSURE: 75 MMHG | TEMPERATURE: 97 F | RESPIRATION RATE: 16 BRPM

## 2023-05-18 DIAGNOSIS — R21 RASH: Primary | ICD-10-CM

## 2023-05-18 PROCEDURE — 63600175 PHARM REV CODE 636 W HCPCS: Performed by: PHYSICIAN ASSISTANT

## 2023-05-18 PROCEDURE — 99284 EMERGENCY DEPT VISIT MOD MDM: CPT

## 2023-05-18 PROCEDURE — 96372 THER/PROPH/DIAG INJ SC/IM: CPT | Performed by: PHYSICIAN ASSISTANT

## 2023-05-18 RX ORDER — DEXAMETHASONE SODIUM PHOSPHATE 4 MG/ML
8 INJECTION, SOLUTION INTRA-ARTICULAR; INTRALESIONAL; INTRAMUSCULAR; INTRAVENOUS; SOFT TISSUE
Status: COMPLETED | OUTPATIENT
Start: 2023-05-18 | End: 2023-05-18

## 2023-05-18 RX ORDER — CETIRIZINE HYDROCHLORIDE 10 MG/1
10 TABLET ORAL DAILY
Qty: 30 TABLET | Refills: 0 | Status: SHIPPED | OUTPATIENT
Start: 2023-05-18 | End: 2023-06-17

## 2023-05-18 RX ORDER — PREDNISONE 10 MG/1
TABLET ORAL
Qty: 40 TABLET | Refills: 0 | Status: SHIPPED | OUTPATIENT
Start: 2023-05-18

## 2023-05-18 RX ORDER — HYDROXYZINE PAMOATE 50 MG/1
50 CAPSULE ORAL EVERY 6 HOURS PRN
Qty: 20 CAPSULE | Refills: 0 | Status: SHIPPED | OUTPATIENT
Start: 2023-05-18 | End: 2023-05-23

## 2023-05-18 RX ORDER — PREDNISONE 10 MG/1
TABLET ORAL
Qty: 40 TABLET | Refills: 0 | Status: SHIPPED | OUTPATIENT
Start: 2023-05-18 | End: 2023-05-18 | Stop reason: SDUPTHER

## 2023-05-18 RX ADMIN — DEXAMETHASONE SODIUM PHOSPHATE 8 MG: 4 INJECTION, SOLUTION INTRA-ARTICULAR; INTRALESIONAL; INTRAMUSCULAR; INTRAVENOUS; SOFT TISSUE at 12:05

## 2023-05-18 NOTE — ED PROVIDER NOTES
"Encounter Date: 5/18/2023       History     Chief Complaint   Patient presents with    Rash     Patient in with "rash" on bilateral hands xseveral months. + itching. No redness noted.     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   Rash   This is a recurrent problem. The current episode started several weeks ago. The problem has been unchanged. Associated with: frequent water exposure.   Review of patient's allergies indicates:  No Known Allergies  Past Medical History:   Diagnosis Date    Hypertension      No past surgical history on file.  No family history on file.  Social History     Tobacco Use    Smoking status: Light Smoker    Smokeless tobacco: Never   Substance Use Topics    Alcohol use: Never    Drug use: Never     Review of Systems   Constitutional:  Negative for fever.   HENT:  Negative for sore throat.    Respiratory:  Negative for shortness of breath.    Cardiovascular:  Negative for chest pain.   Gastrointestinal:  Negative for nausea.   Genitourinary:  Negative for dysuria.   Musculoskeletal:  Negative for back pain.   Skin:  Positive for rash.   Neurological:  Negative for weakness.   Hematological:  Does not bruise/bleed easily.     Physical Exam     Initial Vitals [05/18/23 1123]   BP Pulse Resp Temp SpO2   114/75 83 16 97 °F (36.1 °C) 100 %      MAP       --         Physical Exam    Vitals reviewed.  Constitutional: He appears well-developed.   HENT:   Head: Normocephalic and atraumatic.   Eyes: Conjunctivae and EOM are normal. Pupils are equal, round, and reactive to light.   Neck:   Normal range of motion.  Cardiovascular:  Normal rate, regular rhythm, normal heart sounds and " intact distal pulses.           Pulmonary/Chest: Breath sounds normal. He exhibits no tenderness.   Abdominal: Abdomen is soft. Bowel sounds are normal. He exhibits no distension. There is no abdominal tenderness.   Musculoskeletal:         General: Normal range of motion.      Cervical back: Normal range of motion.     Neurological: He is alert and oriented to person, place, and time. He displays normal reflexes. No cranial nerve deficit or sensory deficit. GCS score is 15. GCS eye subscore is 4. GCS verbal subscore is 5. GCS motor subscore is 6.   Skin: Skin is warm. Rash noted. No petechiae noted. Rash is not nodular, not vesicular and not urticarial. No pallor.        No rash on palms present   Psychiatric: He has a normal mood and affect. His behavior is normal. Judgment and thought content normal.       ED Course   Procedures  Labs Reviewed - No data to display       Imaging Results    None          Medications   dexAMETHasone injection 8 mg (has no administration in time range)                              Clinical Impression:   Final diagnoses:  [R21] Rash (Primary)        ED Disposition Condition    Discharge Stable          ED Prescriptions       Medication Sig Dispense Start Date End Date Auth. Provider    predniSONE (DELTASONE) 10 MG tablet Take 3 tabs twice daily (once in morning, once at night ) x 3 days, then  Take 2 tabs twice daily (once in morning, once at night ) x 3 days, then   Take 1 tab twice daily (once in morning, once at night)  x 3 days, then  Take 1 tab daily in the morning x3 days 40 tablet 5/18/2023 -- JAGJIT Jones    hydrOXYzine pamoate (VISTARIL) 50 MG Cap Take 1 capsule (50 mg total) by mouth every 6 (six) hours as needed (itching). 20 capsule 5/18/2023 5/23/2023 JAGJIT Jones    cetirizine (ZYRTEC) 10 MG tablet Take 1 tablet (10 mg total) by mouth once daily. 30 tablet 5/18/2023 6/17/2023 JAGJIT Jones          Follow-up Information       Follow up With  Specialties Details Why Contact Info    discharge followup    If your symptoms become WORSE or you DO NOT IMPROVE and you are unable to reach your health care provider, you should RETURN to the emergency department    discharge info    Discussed all pertinent ED information, results, diagnosis and treatment plan; All questions and concerns were addressed at this time. Patient voices understanding of information and instructions. Patient is comfortable with plan and discharge             JAGJIT Jones  05/18/23 7337

## 2023-05-22 ENCOUNTER — PATIENT OUTREACH (OUTPATIENT)
Dept: EMERGENCY MEDICINE | Facility: HOSPITAL | Age: 53
End: 2023-05-22
Payer: MEDICAID

## 2023-06-15 ENCOUNTER — LAB VISIT (OUTPATIENT)
Dept: LAB | Facility: HOSPITAL | Age: 53
End: 2023-06-15
Attending: SURGERY
Payer: MEDICAID

## 2023-06-15 DIAGNOSIS — K63.9 COLON ABNORMALITY: Primary | ICD-10-CM

## 2023-06-15 LAB
HEMOCCULT SP1 STL QL: NEGATIVE
HEMOCCULT SP2 STL QL: NEGATIVE
HEMOCCULT SP3 STL QL: NEGATIVE

## 2023-06-15 PROCEDURE — 82270 OCCULT BLOOD FECES: CPT

## 2023-07-28 ENCOUNTER — PATIENT OUTREACH (OUTPATIENT)
Dept: EMERGENCY MEDICINE | Facility: HOSPITAL | Age: 53
End: 2023-07-28
Payer: MEDICAID

## 2023-08-10 ENCOUNTER — PATIENT OUTREACH (OUTPATIENT)
Dept: EMERGENCY MEDICINE | Facility: HOSPITAL | Age: 53
End: 2023-08-10
Payer: MEDICAID

## 2023-08-23 ENCOUNTER — PATIENT OUTREACH (OUTPATIENT)
Dept: EMERGENCY MEDICINE | Facility: HOSPITAL | Age: 53
End: 2023-08-23
Payer: MEDICAID

## 2024-05-06 DIAGNOSIS — K62.89 CHRONIC IDIOPATHIC ANAL PAIN: ICD-10-CM

## 2024-05-06 DIAGNOSIS — G89.29 CHRONIC IDIOPATHIC ANAL PAIN: ICD-10-CM

## 2024-05-06 DIAGNOSIS — M54.9 DORSALGIA: Primary | ICD-10-CM

## 2024-06-10 ENCOUNTER — HOSPITAL ENCOUNTER (OUTPATIENT)
Dept: RADIOLOGY | Facility: HOSPITAL | Age: 54
Discharge: HOME | End: 2024-06-10
Attending: INTERNAL MEDICINE
Payer: COMMERCIAL

## 2024-06-10 DIAGNOSIS — G89.29 CHRONIC IDIOPATHIC ANAL PAIN: ICD-10-CM

## 2024-06-10 DIAGNOSIS — K62.89 CHRONIC IDIOPATHIC ANAL PAIN: ICD-10-CM

## 2024-06-10 DIAGNOSIS — M54.9 DORSALGIA: ICD-10-CM

## 2024-06-10 PROCEDURE — 73030 X-RAY EXAM OF SHOULDER: CPT | Mod: TC,RT

## 2024-06-10 PROCEDURE — 72100 X-RAY EXAM L-S SPINE 2/3 VWS: CPT | Mod: TC

## 2024-06-10 PROCEDURE — 73030 X-RAY EXAM OF SHOULDER: CPT | Mod: TC,LT

## 2024-08-05 ENCOUNTER — HOSPITAL ENCOUNTER (EMERGENCY)
Facility: HOSPITAL | Age: 54
Discharge: HOME | End: 2024-08-05
Attending: EMERGENCY MEDICINE
Payer: COMMERCIAL

## 2024-08-05 VITALS
HEIGHT: 73 IN | OXYGEN SATURATION: 98 % | SYSTOLIC BLOOD PRESSURE: 137 MMHG | WEIGHT: 163.13 LBS | TEMPERATURE: 99 F | HEART RATE: 63 BPM | RESPIRATION RATE: 18 BRPM | BODY MASS INDEX: 21.62 KG/M2 | DIASTOLIC BLOOD PRESSURE: 89 MMHG

## 2024-08-05 DIAGNOSIS — R21 RASH AND NONSPECIFIC SKIN ERUPTION: Primary | ICD-10-CM

## 2024-08-05 PROCEDURE — 99284 EMERGENCY DEPT VISIT MOD MDM: CPT

## 2024-08-05 RX ORDER — TRIAMCINOLONE ACETONIDE 1 MG/G
CREAM TOPICAL 2 TIMES DAILY
Qty: 15 G | Refills: 0 | Status: SHIPPED | OUTPATIENT
Start: 2024-08-05 | End: 2024-08-10

## 2024-08-05 RX ORDER — HYDROXYZINE PAMOATE 25 MG/1
25 CAPSULE ORAL EVERY 6 HOURS PRN
Qty: 20 CAPSULE | Refills: 0 | Status: SHIPPED | OUTPATIENT
Start: 2024-08-05

## 2024-10-14 ENCOUNTER — HOSPITAL ENCOUNTER (EMERGENCY)
Facility: HOSPITAL | Age: 54
Discharge: HOME | End: 2024-10-14
Attending: INTERNAL MEDICINE
Payer: COMMERCIAL

## 2024-10-14 VITALS
WEIGHT: 166.44 LBS | HEIGHT: 73 IN | OXYGEN SATURATION: 99 % | RESPIRATION RATE: 17 BRPM | BODY MASS INDEX: 22.06 KG/M2 | HEART RATE: 81 BPM | TEMPERATURE: 97 F | DIASTOLIC BLOOD PRESSURE: 95 MMHG | SYSTOLIC BLOOD PRESSURE: 149 MMHG

## 2024-10-14 DIAGNOSIS — R21 RASH OF HAND: Primary | ICD-10-CM

## 2024-10-14 DIAGNOSIS — R21 RASH AND NONSPECIFIC SKIN ERUPTION: ICD-10-CM

## 2024-10-14 PROCEDURE — 99284 EMERGENCY DEPT VISIT MOD MDM: CPT

## 2024-10-14 RX ORDER — HYDROXYZINE PAMOATE 25 MG/1
25 CAPSULE ORAL EVERY 6 HOURS PRN
Qty: 20 CAPSULE | Refills: 0 | Status: SHIPPED | OUTPATIENT
Start: 2024-10-14

## 2024-10-14 RX ORDER — TRIAMCINOLONE ACETONIDE 1 MG/G
CREAM TOPICAL 2 TIMES DAILY
Qty: 15 G | Refills: 0 | Status: SHIPPED | OUTPATIENT
Start: 2024-10-14 | End: 2024-10-19

## 2024-10-14 NOTE — ED PROVIDER NOTES
Encounter Date: 10/14/2024       History     Chief Complaint   Patient presents with    Rash     Rash to left hand for a couple of months. States ran out of the cream he had for it.      The history is provided by the patient.   Rash   This is a recurrent problem. Illness onset: July 2024. Associated with: , hand stays wet. Affected Location: Left hand, fingers. Associated symptoms include itching. Pertinent negatives include no blisters, no pain and no weeping. He has tried antihistamines and anti-itch cream for the symptoms. The treatment provided moderate relief. Risk factors include new environmental exposures.     Review of patient's allergies indicates:  No Known Allergies  Past Medical History:   Diagnosis Date    Hypertension      History reviewed. No pertinent surgical history.  No family history on file.  Social History     Tobacco Use    Smoking status: Every Day     Types: Cigarettes    Smokeless tobacco: Never   Substance Use Topics    Alcohol use: Never    Drug use: Never     Review of Systems   Constitutional: Negative.    HENT: Negative.     Eyes: Negative.    Respiratory: Negative.     Cardiovascular: Negative.    Gastrointestinal: Negative.    Genitourinary: Negative.    Musculoskeletal: Negative.    Skin:  Positive for itching and rash. Negative for color change, pallor and wound.   Neurological: Negative.        Physical Exam     Initial Vitals [10/14/24 1518]   BP Pulse Resp Temp SpO2   (!) 149/95 81 17 97.2 °F (36.2 °C) 99 %      MAP       --         Physical Exam    Nursing note and vitals reviewed.  Constitutional: He appears well-developed and well-nourished. He is cooperative.  Non-toxic appearance. He does not have a sickly appearance. He does not appear ill. No distress.   HENT:   Head: Normocephalic and atraumatic.   Right Ear: Hearing, tympanic membrane and external ear normal.   Left Ear: Hearing, tympanic membrane and external ear normal.   Nose: Nose normal. Mouth/Throat:  Uvula is midline, oropharynx is clear and moist and mucous membranes are normal.   Eyes: Conjunctivae and EOM are normal. Pupils are equal, round, and reactive to light. No scleral icterus.   Neck: Trachea normal and phonation normal. Neck supple.   Normal range of motion.  Cardiovascular:  Normal rate, regular rhythm, normal heart sounds, intact distal pulses and normal pulses.           Pulmonary/Chest: Effort normal and breath sounds normal. No accessory muscle usage. No apnea, no tachypnea and no bradypnea. No respiratory distress. He has no decreased breath sounds. He has no wheezes. He has no rhonchi. He has no rales. He exhibits no tenderness.   Normal effort, symmetrical chest rise and fall, no accessory muscle use. Bilateral clear breath sounds in all fields anterior and posterior.      Abdominal: Abdomen is soft. Bowel sounds are normal. He exhibits no distension. There is no abdominal tenderness.   Abdomen is soft, nontender, no peritoneal signs. Tolerating PO fluids.      No right CVA tenderness.  No left CVA tenderness. There is no rebound, no guarding, no tenderness at McBurney's point and negative Pabon's sign. negative psoas sign and negative Rovsing's sign  Musculoskeletal:         General: Normal range of motion.      Cervical back: Normal range of motion and neck supple.     Neurological: He is alert and oriented to person, place, and time. He has normal strength. Gait normal. GCS score is 15. GCS eye subscore is 4. GCS verbal subscore is 5. GCS motor subscore is 6.   Skin: Skin is warm, dry and intact. Capillary refill takes less than 2 seconds. Rash noted.   Left hand dry scaling rash to dorsal fingers, no bleeding or discharge noted. NVI   Psychiatric: He has a normal mood and affect. His speech is normal and behavior is normal. Judgment and thought content normal. Cognition and memory are normal.         ED Course   Procedures  Labs Reviewed - No data to display       Imaging Results     None          Medications - No data to display  Medical Decision Making  Cellulitis, abscess, mass, cyst among others    Rash was responsive to kenalog, when stopped the rash returned. 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   Refill medication, referral to Pcp.  Patient is nontoxic appearing, no acute distress, stable vital signs.   The patient is resting comfortably in no acute distress.  hemodynamically stable and is without objective evidence for acute process requiring urgent intervention or hospitalization. I provided counseling to patient with regard to condition, the treatment plan, specific conditions for return, and the importance of follow up. Detailed written and verbal instructions provided to patient and he expressed a verbal understanding of the discharge instructions and overall management plan. Reiterated the importance of medication administration and safety and advised patient to follow up with primary care provider in 3-5 days or sooner if needed. Answered questions at this time. The patient is stable for discharge.       Risk  Prescription drug management.                                      Clinical Impression:  Final diagnoses:  [R21] Rash of hand (Primary)  [R21] Rash and nonspecific skin eruption          ED Disposition Condition    Discharge Stable          ED Prescriptions       Medication Sig Dispense Start Date End Date Auth. Provider    triamcinolone acetonide 0.1% (KENALOG) 0.1 % cream Apply topically 2 (two) times daily. for 5 days 15 g 10/14/2024 10/19/2024 Joselyn Arora FNP    hydrOXYzine pamoate (VISTARIL) 25 MG Cap Take 1 capsule (25 mg total) by mouth every 6 (six) hours as needed (itching). 20 capsule 10/14/2024 -- Joselyn Arora FNP          Follow-up Information       Follow up With Specialties Details Why Contact Sancho Baez  In 3 days  [address] [place] [id number] [phone]      [facility]  - Emergency Dept Emergency Medicine  If symptoms worsen [address] [facility] [address] [phone]             Joselyn Arora FNP  10/14/24 9505

## 2025-04-05 ENCOUNTER — HOSPITAL ENCOUNTER (EMERGENCY)
Facility: HOSPITAL | Age: 55
Discharge: HOME | End: 2025-04-05
Attending: INTERNAL MEDICINE
Payer: COMMERCIAL

## 2025-04-05 VITALS
SYSTOLIC BLOOD PRESSURE: 122 MMHG | HEIGHT: 74 IN | RESPIRATION RATE: 19 BRPM | HEART RATE: 63 BPM | BODY MASS INDEX: 21.23 KG/M2 | WEIGHT: 165.38 LBS | TEMPERATURE: 98 F | DIASTOLIC BLOOD PRESSURE: 76 MMHG | OXYGEN SATURATION: 99 %

## 2025-04-05 DIAGNOSIS — R21 RASH AND NONSPECIFIC SKIN ERUPTION: Primary | ICD-10-CM

## 2025-04-05 PROCEDURE — 99284 EMERGENCY DEPT VISIT MOD MDM: CPT

## 2025-04-05 RX ORDER — HYDROXYZINE PAMOATE 25 MG/1
25 CAPSULE ORAL EVERY 6 HOURS PRN
Qty: 20 CAPSULE | Refills: 0 | Status: SHIPPED | OUTPATIENT
Start: 2025-04-05

## 2025-04-05 RX ORDER — TRIAMCINOLONE ACETONIDE 1 MG/G
CREAM TOPICAL 2 TIMES DAILY
Qty: 15 G | Refills: 0 | Status: SHIPPED | OUTPATIENT
Start: 2025-04-05 | End: 2025-04-10

## 2025-04-05 RX ORDER — PREDNISONE 10 MG/1
TABLET ORAL
Qty: 40 TABLET | Refills: 0 | Status: SHIPPED | OUTPATIENT
Start: 2025-04-05

## 2025-04-05 NOTE — ED PROVIDER NOTES
Encounter Date: 4/5/2025       History     Chief Complaint   Patient presents with    Rash     Pt reports a rash on bilateral hands for 4 months. No new or worsening problems reported.      54-year-old male, presents to the emergency department with recurrent skin rash that is mainly on his left hand.  The rash has been intermittently present for the past 2 years, it is responsive to steroids and Kenalog cream.  He works as a  and spends most of his days in a moist environment.  Is requesting a PCP and referral to Dermatology.  The rashes intermittently pruritic, painful, and dry.  Denies fever, open lesions, numbness, tingling, weight loss, cough, chest pain, shortness of breath, STI exposures, penile discharge, open lesions.     The history is provided by the patient.     Review of patient's allergies indicates:  No Known Allergies  Past Medical History:   Diagnosis Date    Hypertension      History reviewed. No pertinent surgical history.  No family history on file.  Social History[1]  Review of Systems   Constitutional: Negative.    HENT: Negative.     Eyes: Negative.    Respiratory: Negative.     Cardiovascular: Negative.    Gastrointestinal: Negative.    Genitourinary: Negative.    Musculoskeletal: Negative.    Skin:  Positive for rash.   Neurological: Negative.        Physical Exam     Initial Vitals [04/05/25 1518]   BP Pulse Resp Temp SpO2   132/80 60 18 98.2 °F (36.8 °C) 99 %      MAP       --         Physical Exam    Nursing note and vitals reviewed.  Constitutional: He appears well-developed and well-nourished. He is cooperative.  Non-toxic appearance. He does not have a sickly appearance. He does not appear ill. No distress.   HENT:   Head: Normocephalic and atraumatic.   Right Ear: Hearing, tympanic membrane and external ear normal.   Left Ear: Hearing, tympanic membrane and external ear normal.   Nose: Nose normal. Mouth/Throat: Uvula is midline, oropharynx is clear and moist and mucous  membranes are normal.   Eyes: Conjunctivae and EOM are normal. Pupils are equal, round, and reactive to light. No scleral icterus.   Neck: Trachea normal and phonation normal. Neck supple.   Normal range of motion.  Cardiovascular:  Normal rate, regular rhythm, normal heart sounds, intact distal pulses and normal pulses.           Pulmonary/Chest: Effort normal and breath sounds normal. No accessory muscle usage. No apnea, no tachypnea and no bradypnea. No respiratory distress. He has no decreased breath sounds. He has no wheezes. He has no rhonchi. He has no rales. He exhibits no tenderness.   Normal effort, symmetrical chest rise and fall, no accessory muscle use. Bilateral clear breath sounds in all fields anterior and posterior.      Abdominal: Abdomen is soft. Bowel sounds are normal. He exhibits no distension. There is no abdominal tenderness.   Abdomen is soft, nontender, no peritoneal signs. Tolerating PO fluids.      No right CVA tenderness.  No left CVA tenderness. There is no rebound, no guarding, no tenderness at McBurney's point and negative Pabon's sign. negative psoas sign and negative Rovsing's sign  Musculoskeletal:         General: Normal range of motion.      Cervical back: Normal range of motion and neck supple.     Neurological: He is alert and oriented to person, place, and time. He has normal strength. Gait normal. GCS score is 15. GCS eye subscore is 4. GCS verbal subscore is 5. GCS motor subscore is 6.   Skin: Skin is warm and dry. Capillary refill takes less than 2 seconds. Rash noted.        Psychiatric: He has a normal mood and affect. His speech is normal and behavior is normal. Judgment and thought content normal. Cognition and memory are normal.         ED Course   Procedures  Labs Reviewed - No data to display       Imaging Results    None          Medications - No data to display  Medical Decision Making  Cellulitis, abscess, mass, cyst among others    Recurrent Skin rash mainly on  his hands, he works as a  his hands are in water most of the day.  He has been treated 4 times in the last 2 years for this skin rash.  He states the medicines work however the rash does return.  The rash is intermittently painful, dry, and pruritic He is requesting a referral to PCP.  Patient given prescription for Vistaril, prednisone, Kenalog cream.  Ambulatory referral is placed for Dermatology and Internal Medicine. The patient's vital signs and condition remained stable while undergoing evaluation in the Emergency Department. The patient agreed with the plan for care. The patient is nontoxic appearing, in no acute distress, with stable vital signs and resting comfortably. There is no objective evidence requiring urgent intervention or hospitalization. I provided counseling to the patient with regard to the medical condition, the treatment plan, specific conditions for return, and the importance of follow up. Detailed written and verbal instructions were provided to the patient and he/she expressed a verbal understanding of the discharge instructions and overall management plan. Reiterated the importance of medication administration and safety, advised the patient to follow up with primary care provider in 3-5 days or sooner if needed. All questions and concerns were addressed before leaving the Emergency Department. The patient is stable for discharge.       Risk  Prescription drug management.                                      Clinical Impression:  Final diagnoses:  [R21] Rash and nonspecific skin eruption (Primary)          ED Disposition Condition    Discharge Stable          ED Prescriptions       Medication Sig Dispense Start Date End Date Auth. Provider    hydrOXYzine pamoate (VISTARIL) 25 MG Cap Take 1 capsule (25 mg total) by mouth every 6 (six) hours as needed (itching). 20 capsule 4/5/2025 -- Joselyn Arora FNP    predniSONE (DELTASONE) 10 MG tablet Take 3 tabs twice daily (once in  morning, once at night ) x 3 days, then Take 2 tabs twice daily (once in morning, once at night ) x 3 days, then Take 1 tab twice daily (once in morning, once at night)  x 3 days, then Take 1 tab daily in the morning x3 days 40 tablet 4/5/2025 -- Joselyn Arora FNP    triamcinolone acetonide 0.1% (KENALOG) 0.1 % cream Apply topically 2 (two) times daily. for 5 days 15 g 4/5/2025 4/10/2025 Joselyn Arora FNP          Follow-up Information       Follow up With Specialties Details Why Contact Info [facility] Emergency Medicine  If symptoms worsen [address] [facility] [address] [phone]    PCP  In 1 week  Follow up with PCP in 3-5 days.               [1]   Social History  Tobacco Use    Smoking status: Every Day     Types: Cigarettes    Smokeless tobacco: Never   Substance Use Topics    Alcohol use: Never    Drug use: Never        Joselyn Arora FNP  04/15/25 0708

## 2025-04-22 ENCOUNTER — HOSPITAL ENCOUNTER (EMERGENCY)
Facility: HOSPITAL | Age: 55
Discharge: HOME | End: 2025-04-22
Attending: EMERGENCY MEDICINE
Payer: COMMERCIAL

## 2025-04-22 VITALS
SYSTOLIC BLOOD PRESSURE: 151 MMHG | DIASTOLIC BLOOD PRESSURE: 93 MMHG | WEIGHT: 166 LBS | RESPIRATION RATE: 18 BRPM | OXYGEN SATURATION: 100 % | TEMPERATURE: 98 F | HEART RATE: 84 BPM | BODY MASS INDEX: 21.3 KG/M2

## 2025-04-22 DIAGNOSIS — M54.42 CHRONIC BILATERAL LOW BACK PAIN WITH BILATERAL SCIATICA: Primary | ICD-10-CM

## 2025-04-22 DIAGNOSIS — Z76.0 MEDICATION REFILL: ICD-10-CM

## 2025-04-22 DIAGNOSIS — M54.41 CHRONIC BILATERAL LOW BACK PAIN WITH BILATERAL SCIATICA: Primary | ICD-10-CM

## 2025-04-22 DIAGNOSIS — G89.29 CHRONIC BILATERAL LOW BACK PAIN WITH BILATERAL SCIATICA: Primary | ICD-10-CM

## 2025-04-22 PROCEDURE — 96372 THER/PROPH/DIAG INJ SC/IM: CPT | Performed by: NURSE PRACTITIONER

## 2025-04-22 PROCEDURE — 99284 EMERGENCY DEPT VISIT MOD MDM: CPT | Mod: 25

## 2025-04-22 PROCEDURE — 63600175 PHARM REV CODE 636 W HCPCS: Mod: JZ,TB | Performed by: NURSE PRACTITIONER

## 2025-04-22 RX ORDER — PRAVASTATIN SODIUM 20 MG/1
1 TABLET ORAL NIGHTLY
COMMUNITY
End: 2025-04-22

## 2025-04-22 RX ORDER — AMLODIPINE BESYLATE 5 MG/1
5 TABLET ORAL DAILY
Qty: 30 TABLET | Refills: 0 | Status: SHIPPED | OUTPATIENT
Start: 2025-04-22 | End: 2025-05-22

## 2025-04-22 RX ORDER — DICLOFENAC SODIUM 75 MG/1
75 TABLET, DELAYED RELEASE ORAL 2 TIMES DAILY PRN
Qty: 20 TABLET | Refills: 0 | Status: SHIPPED | OUTPATIENT
Start: 2025-04-22

## 2025-04-22 RX ORDER — KETOROLAC TROMETHAMINE 30 MG/ML
30 INJECTION, SOLUTION INTRAMUSCULAR; INTRAVENOUS
Status: COMPLETED | OUTPATIENT
Start: 2025-04-22 | End: 2025-04-22

## 2025-04-22 RX ORDER — PRAVASTATIN SODIUM 20 MG/1
20 TABLET ORAL NIGHTLY
Qty: 30 TABLET | Refills: 0 | Status: SHIPPED | OUTPATIENT
Start: 2025-04-22

## 2025-04-22 RX ORDER — METHOCARBAMOL 500 MG/1
1000 TABLET, FILM COATED ORAL 3 TIMES DAILY PRN
Qty: 30 TABLET | Refills: 0 | Status: SHIPPED | OUTPATIENT
Start: 2025-04-22

## 2025-04-22 RX ADMIN — KETOROLAC TROMETHAMINE 30 MG: 30 INJECTION, SOLUTION INTRAMUSCULAR at 04:04

## 2025-04-22 NOTE — ED PROVIDER NOTES
Encounter Date: 4/22/2025       History     Chief Complaint   Patient presents with    Back Pain     PT W CO BACK PAIN AND MUSCLE SPASMS IN LEGS FOR MONTHS.  DENIES INJURY.  ALSO REQUESTING MED REFILL FOR HTN, NO PCP.       The patient presents with low back pain.  The onset was chronic increased for a couple of weeks.  The course/duration of symptoms is constant.  Type of injury: none and none recent, denies falls.  Location: lumbar. Radiating pain: both lower extremities. The character of symptoms is dull.  The degree at onset was moderate.  The degree at present is moderate.  There are exacerbating factors including movement and changing position.  The relieving factor is rest.  Risk factors consist of none.  Prior episodes: chronic.  Therapy today: none.  Associated symptoms: none, denies bowel dysfunction, denies bladder dysfunction, denies altered sensation, denies focal weakness, denies saddle numbness, denies abdominal pain and denies dysuria. He also requests refill of bp medication and referral to clinic for a pcp.      Review of patient's allergies indicates:  No Known Allergies  Past Medical History:   Diagnosis Date    Hypertension      History reviewed. No pertinent surgical history.  No family history on file.  Social History[1]  Review of Systems   Constitutional:  Negative for fever.   HENT:  Negative for sore throat.    Respiratory:  Negative for shortness of breath.    Cardiovascular:  Negative for chest pain.   Gastrointestinal:  Negative for nausea.   Genitourinary:  Negative for dysuria.   Musculoskeletal:  Positive for back pain.   Skin:  Negative for rash.   Neurological:  Negative for weakness.   Hematological:  Does not bruise/bleed easily.   All other systems reviewed and are negative.      Physical Exam     Initial Vitals [04/22/25 1610]   BP Pulse Resp Temp SpO2   (!) 151/93 84 18 97.9 °F (36.6 °C) 100 %      MAP       --         Physical Exam    Nursing note and vitals  reviewed.  Constitutional: He appears well-developed and well-nourished.   HENT:   Head: Normocephalic and atraumatic.   Neck: Neck supple.   Normal range of motion.  Cardiovascular:  Normal rate, regular rhythm, normal heart sounds and intact distal pulses.           Pulmonary/Chest: Effort normal and breath sounds normal. He has no decreased breath sounds.   Abdominal: Abdomen is soft and flat. Bowel sounds are normal. There is no abdominal tenderness.   Musculoskeletal:         General: Normal range of motion.      Cervical back: Normal range of motion and neck supple.      Comments: Back: No costovertebral angle tenderness, , Thoracic: no vertebral point tenderness, Lumbar: lateral mild tenderness, midline negative, no vertebral point tenderness, Sacral: no vertebral point tenderness, Testing: Straight leg raising, supine negative.  No C/T/L point tenderness. normal reflexes.        Neurological: He is alert and oriented to person, place, and time. He has normal strength.   Skin: Skin is warm and dry.   Psychiatric: He has a normal mood and affect.         ED Course   Procedures  Labs Reviewed - No data to display       Imaging Results    None          Medications   ketorolac injection 30 mg (has no administration in time range)     Medical Decision Making  The patient presents with low back pain.  The onset was chronic increased for a couple of weeks.  The course/duration of symptoms is constant.  Type of injury: none and none recent, denies falls.  Location: lumbar. Radiating pain: both lower extremities. The character of symptoms is dull.  The degree at onset was moderate.  The degree at present is moderate.  There are exacerbating factors including movement and changing position.  The relieving factor is rest.  Risk factors consist of none.  Prior episodes: chronic.  Therapy today: none.  Associated symptoms: none, denies bowel dysfunction, denies bladder dysfunction, denies altered sensation, denies focal  weakness, denies saddle numbness, denies abdominal pain and denies dysuria. He also requests refill of bp medication and referral to clinic for a pcp. Denies chest pain and shortness of breath.    Referral sent to medicine clinic per request for a pcp.4:22 PM DISPOSITION: The patient is resting comfortably in no acute distress.  He is hemodynamically stable and is without objective evidence for acute process requiring urgent intervention or hospitalization. I provided counseling to patient with regard to condition, the treatment plan, specific conditions for return, and the importance of follow up. Detailed written and verbal instructions provided to patient and he expressed a verbal understanding of the discharge instructions and overall management plan. Reiterated the importance of medication administration and safety and advised patient to follow up with primary care provider in 3-5 days or sooner if needed.  Answered questions at this time. The patient is stable for discharge.         Risk  Prescription drug management.      Additional MDM:   Differential Diagnosis:   At this time differential diagnosis is but not limited to fracture, sprain, sciatica               ED Course as of 04/22/25 1626   Tue Apr 22, 2025 1621 Given strict ED return precautions. I have spoken with the patient and/or caregivers. I have explained the patient's condition, diagnoses and treatment plan based on the information available to me at this time. I have answered the patient's and/or caregiver's questions and addressed any concerns. The patient and/or caregivers have as good an understanding of the patient's diagnosis, condition and treatment plan as can be expected at this point. The vital signs have been stable. The patient's condition is stable and appropriate for discharge from the emergency department.      The patient will pursue further outpatient evaluation with the primary care physician or other designated or consulting  physician as outlined in the discharge instructions. The patient and/or caregivers are agreeable to this plan of care and follow-up instructions have been explained in detail. The patient and/or caregivers have received these instructions in written format and have expressed an understanding of the discharge instructions. The patient and/or caregivers are aware that any significant change in condition or worsening of symptoms should prompt an immediate return to this or the closest emergency department or a call to 911.      [RB]      ED Course User Index  [RB] Dayron Mayo ACNP                           Clinical Impression:  Final diagnoses:  [M54.42, M54.41, G89.29] Chronic bilateral low back pain with bilateral sciatica (Primary)  [Z76.0] Medication refill          ED Disposition Condition    Discharge Good          ED Prescriptions       Medication Sig Dispense Start Date End Date Auth. Provider    amLODIPine (NORVASC) 5 MG tablet Take 1 tablet (5 mg total) by mouth once daily. 30 tablet 4/22/2025 5/22/2025 Dayron Mayo ACNP    pravastatin (PRAVACHOL) 20 MG tablet Take 1 tablet (20 mg total) by mouth every evening. 30 tablet 4/22/2025 -- Dayron Mayo ACNP    diclofenac (VOLTAREN) 75 MG EC tablet Take 1 tablet (75 mg total) by mouth 2 (two) times daily as needed (pain). Don't take with ibuprofen, motrin, aleve, naprosyn or any other NSAID. 20 tablet 4/22/2025 -- Dayron Mayo ACNP    methocarbamoL (ROBAXIN) 500 MG Tab Take 2 tablets (1,000 mg total) by mouth 3 (three) times daily as needed (muscle spasms or pain). May cause drowsiness. 30 tablet 4/22/2025 -- Dayron Mayo ACNP          Follow-up Information       Follow up With Specialties Details Why Contact Info    referral sent to medicine clinic per request for a primary care provider        [facility] Emergency Medicine  If symptoms worsen [address] [facility] [address] [phone]               [1]   Social  History  Tobacco Use    Smoking status: Every Day     Current packs/day: 0.50     Average packs/day: 0.5 packs/day for 38.3 years (19.2 ttl pk-yrs)     Types: Cigarettes     Start date: 1987    Smokeless tobacco: Never   Substance Use Topics    Alcohol use: Never    Drug use: Never        Dayron Mayo ACNP  04/22/25 1625

## 2025-05-10 ENCOUNTER — HOSPITAL ENCOUNTER (EMERGENCY)
Facility: HOSPITAL | Age: 55
Discharge: HOME | End: 2025-05-10
Attending: FAMILY MEDICINE
Payer: COMMERCIAL

## 2025-05-10 VITALS
SYSTOLIC BLOOD PRESSURE: 118 MMHG | HEART RATE: 66 BPM | OXYGEN SATURATION: 100 % | DIASTOLIC BLOOD PRESSURE: 84 MMHG | WEIGHT: 166 LBS | HEIGHT: 73 IN | RESPIRATION RATE: 14 BRPM | BODY MASS INDEX: 22 KG/M2 | TEMPERATURE: 98 F

## 2025-05-10 DIAGNOSIS — M54.50 BILATERAL LOW BACK PAIN, UNSPECIFIED CHRONICITY, UNSPECIFIED WHETHER SCIATICA PRESENT: ICD-10-CM

## 2025-05-10 DIAGNOSIS — M62.838 MUSCLE SPASMS OF BOTH LOWER EXTREMITIES: Primary | ICD-10-CM

## 2025-05-10 LAB
ALBUMIN SERPL-MCNC: 3.6 G/DL (ref 3.5–5)
ALBUMIN/GLOB SERPL: 1.1 RATIO (ref 1.1–2)
ALP SERPL-CCNC: 100 UNIT/L (ref 40–150)
ALT SERPL-CCNC: 40 UNIT/L (ref 0–55)
ANION GAP SERPL CALC-SCNC: 8 MEQ/L
AST SERPL-CCNC: 36 UNIT/L (ref 11–45)
BACTERIA #/AREA URNS AUTO: ABNORMAL /HPF
BASOPHILS # BLD AUTO: 0.05 X10(3)/MCL
BASOPHILS NFR BLD AUTO: 0.7 %
BILIRUB SERPL-MCNC: 0.4 MG/DL
BILIRUB UR QL STRIP.AUTO: NEGATIVE
BUN SERPL-MCNC: 6 MG/DL (ref 8.4–25.7)
CALCIUM SERPL-MCNC: 9.6 MG/DL (ref 8.4–10.2)
CHLORIDE SERPL-SCNC: 105 MMOL/L (ref 98–107)
CK SERPL-CCNC: 174 U/L (ref 30–200)
CLARITY UR: CLEAR
CO2 SERPL-SCNC: 26 MMOL/L (ref 22–29)
COLOR UR AUTO: COLORLESS
CREAT SERPL-MCNC: 0.9 MG/DL (ref 0.72–1.25)
CREAT/UREA NIT SERPL: 7
EOSINOPHIL # BLD AUTO: 0.24 X10(3)/MCL (ref 0–0.9)
EOSINOPHIL NFR BLD AUTO: 3.2 %
ERYTHROCYTE [DISTWIDTH] IN BLOOD BY AUTOMATED COUNT: 14.3 % (ref 11.5–17)
GFR SERPLBLD CREATININE-BSD FMLA CKD-EPI: >60 ML/MIN/1.73/M2
GLOBULIN SER-MCNC: 3.3 GM/DL (ref 2.4–3.5)
GLUCOSE SERPL-MCNC: 82 MG/DL (ref 74–100)
GLUCOSE UR QL STRIP: NORMAL
HCT VFR BLD AUTO: 44.4 % (ref 42–52)
HGB BLD-MCNC: 14.3 G/DL (ref 14–18)
HGB UR QL STRIP: NEGATIVE
HOLD SPECIMEN: NORMAL
HYALINE CASTS #/AREA URNS LPF: ABNORMAL /LPF
IMM GRANULOCYTES # BLD AUTO: 0.02 X10(3)/MCL (ref 0–0.04)
IMM GRANULOCYTES NFR BLD AUTO: 0.3 %
KETONES UR QL STRIP: NEGATIVE
LEUKOCYTE ESTERASE UR QL STRIP: NEGATIVE
LYMPHOCYTES # BLD AUTO: 1.72 X10(3)/MCL (ref 0.6–4.6)
LYMPHOCYTES NFR BLD AUTO: 23.3 %
MAGNESIUM SERPL-MCNC: 2.2 MG/DL (ref 1.6–2.6)
MCH RBC QN AUTO: 27.6 PG (ref 27–31)
MCHC RBC AUTO-ENTMCNC: 32.2 G/DL (ref 33–36)
MCV RBC AUTO: 85.7 FL (ref 80–94)
MONOCYTES # BLD AUTO: 0.62 X10(3)/MCL (ref 0.1–1.3)
MONOCYTES NFR BLD AUTO: 8.4 %
NEUTROPHILS # BLD AUTO: 4.74 X10(3)/MCL (ref 2.1–9.2)
NEUTROPHILS NFR BLD AUTO: 64.1 %
NITRITE UR QL STRIP: NEGATIVE
NRBC BLD AUTO-RTO: 0 %
PH UR STRIP: 5.5 [PH]
PHOSPHATE SERPL-MCNC: 3.5 MG/DL (ref 2.3–4.7)
PLATELET # BLD AUTO: 401 X10(3)/MCL (ref 130–400)
PMV BLD AUTO: 10.7 FL (ref 7.4–10.4)
POTASSIUM SERPL-SCNC: 4.5 MMOL/L (ref 3.5–5.1)
PROT SERPL-MCNC: 6.9 GM/DL (ref 6.4–8.3)
PROT UR QL STRIP: NEGATIVE
RBC # BLD AUTO: 5.18 X10(6)/MCL (ref 4.7–6.1)
RBC #/AREA URNS AUTO: ABNORMAL /HPF
SODIUM SERPL-SCNC: 139 MMOL/L (ref 136–145)
SP GR UR STRIP.AUTO: 1 (ref 1–1.03)
SQUAMOUS #/AREA URNS LPF: ABNORMAL /HPF
UROBILINOGEN UR STRIP-ACNC: NORMAL
WBC # BLD AUTO: 7.39 X10(3)/MCL (ref 4.5–11.5)
WBC #/AREA URNS AUTO: ABNORMAL /HPF

## 2025-05-10 PROCEDURE — 83735 ASSAY OF MAGNESIUM: CPT

## 2025-05-10 PROCEDURE — 80053 COMPREHEN METABOLIC PANEL: CPT

## 2025-05-10 PROCEDURE — 25000003 PHARM REV CODE 250

## 2025-05-10 PROCEDURE — 84100 ASSAY OF PHOSPHORUS: CPT

## 2025-05-10 PROCEDURE — 99283 EMERGENCY DEPT VISIT LOW MDM: CPT

## 2025-05-10 PROCEDURE — 85025 COMPLETE CBC W/AUTO DIFF WBC: CPT

## 2025-05-10 PROCEDURE — 81001 URINALYSIS AUTO W/SCOPE: CPT

## 2025-05-10 PROCEDURE — 82550 ASSAY OF CK (CPK): CPT

## 2025-05-10 RX ORDER — CYCLOBENZAPRINE HCL 10 MG
10 TABLET ORAL
Status: COMPLETED | OUTPATIENT
Start: 2025-05-10 | End: 2025-05-10

## 2025-05-10 RX ORDER — TIZANIDINE 4 MG/1
4 TABLET ORAL EVERY 6 HOURS PRN
Qty: 28 TABLET | Refills: 0 | Status: SHIPPED | OUTPATIENT
Start: 2025-05-10 | End: 2025-05-17

## 2025-05-10 RX ADMIN — CYCLOBENZAPRINE 10 MG: 10 TABLET, FILM COATED ORAL at 02:05

## 2025-05-10 NOTE — ED PROVIDER NOTES
"Encounter Date: 5/10/2025       History     Chief Complaint   Patient presents with    Spasms     Muscle spasms in multiple body regions. States "the white pills that I was prescribed did not work. I need the strong brown pills."      A 54 y.o. male patient with a history of HTN presents to the ED with muscle spasms to bilateral back and down posterior thighs. The onset is about a month ago. Patient denies any dark urination, generalized weakness, decreased urination, decreased PO intake, fever, chills, N/V. Patient states he is here because he was prescribed robaxin and it is not helping. He states about a year ago he was prescribed tizanidine for similar symptoms and it helped a lot. He is requesting a prescription of this.       The history is provided by the patient.     Review of patient's allergies indicates:  No Known Allergies  Past Medical History:   Diagnosis Date    Hypertension      History reviewed. No pertinent surgical history.  No family history on file.  Social History[1]  Review of Systems   Constitutional:  Negative for chills and fever.   Eyes:  Negative for visual disturbance.   Respiratory:  Negative for shortness of breath.    Cardiovascular:  Negative for chest pain.   Gastrointestinal:  Negative for nausea and vomiting.   Genitourinary:  Negative for difficulty urinating and dysuria.   Musculoskeletal:  Positive for arthralgias, back pain and myalgias. Negative for neck pain and neck stiffness.   Skin:  Negative for color change and rash.   Neurological:  Negative for weakness and headaches.   Hematological:  Does not bruise/bleed easily.   Psychiatric/Behavioral:  Negative for confusion.    All other systems reviewed and are negative.      Physical Exam     Initial Vitals [05/10/25 1318]   BP Pulse Resp Temp SpO2   118/84 66 14 97.8 °F (36.6 °C) 100 %      MAP       --         Physical Exam    Nursing note and vitals reviewed.  Constitutional: He appears well-developed and well-nourished. "   HENT:   Head: Normocephalic and atraumatic.   Right Ear: External ear normal.   Left Ear: External ear normal.   Nose: Nose normal.   Eyes: Conjunctivae and EOM are normal.   Neck: Neck supple.   Cardiovascular:  Normal rate, regular rhythm and normal heart sounds.     Exam reveals no gallop and no friction rub.       No murmur heard.  Pulmonary/Chest: Breath sounds normal. No respiratory distress. He has no wheezes. He has no rhonchi. He has no rales.   Abdominal: He exhibits no distension.   Musculoskeletal:      Cervical back: Neck supple.      Lumbar back: Spasms, tenderness and bony tenderness present. No swelling, edema, deformity or signs of trauma. Positive right straight leg raise test and positive left straight leg raise test.        Back:      Neurological: He is alert and oriented to person, place, and time. GCS eye subscore is 4. GCS verbal subscore is 5. GCS motor subscore is 6.   Skin: Skin is warm and dry. Capillary refill takes less than 2 seconds.         ED Course   Procedures  Labs Reviewed   COMPREHENSIVE METABOLIC PANEL - Abnormal       Result Value    Sodium 139      Potassium 4.5      Chloride 105      CO2 26      Glucose 82      Blood Urea Nitrogen 6.0 (*)     Creatinine 0.90      Calcium 9.6      Protein Total 6.9      Albumin 3.6      Globulin 3.3      Albumin/Globulin Ratio 1.1      Bilirubin Total 0.4            ALT 40      AST 36      eGFR >60      Anion Gap 8.0      BUN/Creatinine Ratio 7     URINALYSIS, REFLEX TO URINE CULTURE - Abnormal    Color, UA Colorless (*)     Appearance, UA Clear      Specific Gravity, UA 1.005      pH, UA 5.5      Protein, UA Negative      Glucose, UA Normal      Ketones, UA Negative      Blood, UA Negative      Bilirubin, UA Negative      Urobilinogen, UA Normal      Nitrites, UA Negative      Leukocyte Esterase, UA Negative      RBC, UA 0-5      WBC, UA 0-5      Bacteria, UA None Seen      Squamous Epithelial Cells, UA None Seen      Hyaline  Casts, UA None Seen     CBC WITH DIFFERENTIAL - Abnormal    WBC 7.39      RBC 5.18      Hgb 14.3      Hct 44.4      MCV 85.7      MCH 27.6      MCHC 32.2 (*)     RDW 14.3      Platelet 401 (*)     MPV 10.7 (*)     Neut % 64.1      Lymph % 23.3      Mono % 8.4      Eos % 3.2      Basophil % 0.7      Imm Grans % 0.3      Neut # 4.74      Lymph # 1.72      Mono # 0.62      Eos # 0.24      Baso # 0.05      Imm Gran # 0.02      NRBC% 0.0     MAGNESIUM - Normal    Magnesium Level 2.20     PHOSPHORUS - Normal    Phosphorus Level 3.5     CK - Normal    Creatine Kinase 174     CBC W/ AUTO DIFFERENTIAL    Narrative:     The following orders were created for panel order CBC Auto Differential.  Procedure                               Abnormality         Status                     ---------                               -----------         ------                     CBC with Differential[[id number]]       Abnormal            Final result                 Please view results for these tests on the individual orders.   EXTRA TUBES    Narrative:     The following orders were created for panel order EXTRA TUBES.  Procedure                               Abnormality         Status                     ---------                               -----------         ------                     Light Blue Top Hold[[id number]]                             In process                 Lavender Top Hold[[id number]]                               In process                 Gold Top Hold[[id number]]                                   In process                   Please view results for these tests on the individual orders.   LIGHT BLUE TOP HOLD   LAVENDER TOP HOLD   GOLD TOP HOLD          Imaging Results    None          Medications   cyclobenzaprine tablet 10 mg (10 mg Oral Given 5/10/25 1402)     Medical Decision Making  A 54 y.o. male patient with a history of HTN presents to the ED with muscle spasms to bilateral back and down posterior thighs. The  onset is about a month ago. Patient denies any dark urination, generalized weakness, decreased urination, decreased PO intake, fever, chills, N/V. Patient states he is here because he was prescribed robaxin and it is not helping. He states about a year ago he was prescribed tizanidine for similar symptoms and it helped a lot. He is requesting a prescription of this.       Labs negative for electrolyte disturbance or dehydration.     Patient's condition improved with the following Medications  cyclobenzaprine tablet 10 mg (10 mg Oral Given 5/10/25 1402)    Clinical impression:  Muscle spasms of both lower extremities (Primary)  Bilateral low back pain, unspecified chronicity, unspecified whether sciatica present    Patient is non-toxic appearing and tolerating nutritional intake. Patient's vital signs and clinical condition are stable for DC with ED Prescriptions     Medication Sig Dispense Start Date End Date Auth. Provider    tiZANidine (ZANAFLEX) 4 MG tablet Take 1 tablet (4 mg total) by mouth   every 6 (six) hours as needed (muscle spasms/pain). 28 tablet 5/10/2025   5/17/2025 Zohreh Portillo PA         Follow-up: PCP or Internal medicine clinic within 3 days  Referrals made: none    Strict follow-up precautions given. Patient verbalizes understanding of treatment plan and ED return precautions.         Amount and/or Complexity of Data Reviewed  Labs: ordered. Decision-making details documented in ED Course.    Risk  Prescription drug management.  Risk Details: Given strict ED return precautions. I have spoken with the patient and/or caregivers. I have explained the patient's condition, diagnoses and treatment plan based on the information available to me at this time. I have answered the patient's and/or caregiver's questions and addressed any concerns. The patient and/or caregivers have as good an understanding of the patient's diagnosis, condition and treatment plan as can be expected at this point. The  patient's condition is stable and appropriate for discharge from the emergency department.      The patient will pursue further outpatient evaluation with the primary care physician or other designated or consulting physician as outlined in the discharge instructions. The patient and/or caregivers are agreeable to this plan of care and follow-up instructions have been explained in detail. The patient and/or caregivers have received these instructions in written format and have expressed an understanding of the discharge instructions. The patient and/or caregivers are aware that any significant change in condition or worsening of symptoms should prompt an immediate return to this or the closest emergency department or a call to 911.               Additional MDM:   Differential Diagnosis:   Other: The following diagnoses were also considered and will be evaluated: Contusion, Strain and Sprain.            ED Course as of 05/10/25 1552   Sat May 10, 2025   1427 WBC: 7.39 [AG]   1427 Hemoglobin: 14.3 [AG]   1427 Bacteria, UA: None Seen [AG]   1427 Leukocyte Esterase, UA: Negative [AG]   1504 eGFR: >60 [AG]   1504 Magnesium : 2.20 [AG]   1504 CPK: 174 [AG]      ED Course User Index  [AG] Zohreh Portillo PA                           Clinical Impression:  Final diagnoses:  [M62.838] Muscle spasms of both lower extremities (Primary)  [M54.50] Bilateral low back pain, unspecified chronicity, unspecified whether sciatica present          ED Disposition Condition    Discharge Stable          ED Prescriptions       Medication Sig Dispense Start Date End Date Auth. Provider    tiZANidine (ZANAFLEX) 4 MG tablet Take 1 tablet (4 mg total) by mouth every 6 (six) hours as needed (muscle spasms/pain). 28 tablet 5/10/2025 5/17/2025 Zohreh Portillo PA          Follow-up Information       Follow up With Specialties Details Why Contact Info    [facility] Emergency Medicine Go to  If symptoms worsen, As  needed [address] [facility] [id number] [phone]    Your primary care provider  Go in 3 days                 [1]   Social History  Tobacco Use    Smoking status: Every Day     Current packs/day: 0.50     Average packs/day: 0.5 packs/day for 38.4 years (19.2 ttl pk-yrs)     Types: Cigarettes     Start date: 1987    Smokeless tobacco: Never   Substance Use Topics    Alcohol use: Never    Drug use: Never        Zohreh Portillo PA  05/10/25 1557

## 2025-05-31 ENCOUNTER — HOSPITAL ENCOUNTER (EMERGENCY)
Facility: HOSPITAL | Age: 55
Discharge: HOME | End: 2025-05-31
Attending: EMERGENCY MEDICINE
Payer: COMMERCIAL

## 2025-05-31 VITALS
OXYGEN SATURATION: 99 % | RESPIRATION RATE: 19 BRPM | HEART RATE: 81 BPM | HEIGHT: 73 IN | TEMPERATURE: 98 F | SYSTOLIC BLOOD PRESSURE: 109 MMHG | WEIGHT: 169 LBS | DIASTOLIC BLOOD PRESSURE: 76 MMHG | BODY MASS INDEX: 22.4 KG/M2

## 2025-05-31 DIAGNOSIS — I10 HYPERTENSION, UNSPECIFIED TYPE: ICD-10-CM

## 2025-05-31 DIAGNOSIS — M62.838 MUSCLE SPASMS OF LOWER EXTREMITY: ICD-10-CM

## 2025-05-31 DIAGNOSIS — E78.5 HYPERLIPIDEMIA, UNSPECIFIED HYPERLIPIDEMIA TYPE: ICD-10-CM

## 2025-05-31 DIAGNOSIS — Z76.0 ENCOUNTER FOR MEDICATION REFILL: Primary | ICD-10-CM

## 2025-05-31 PROCEDURE — 99281 EMR DPT VST MAYX REQ PHY/QHP: CPT

## 2025-05-31 RX ORDER — PRAVASTATIN SODIUM 20 MG/1
20 TABLET ORAL NIGHTLY
Qty: 90 TABLET | Refills: 0 | Status: SHIPPED | OUTPATIENT
Start: 2025-05-31 | End: 2025-08-29

## 2025-05-31 RX ORDER — AMLODIPINE BESYLATE 5 MG/1
5 TABLET ORAL DAILY
Qty: 90 TABLET | Refills: 0 | Status: SHIPPED | OUTPATIENT
Start: 2025-05-31 | End: 2025-08-29

## 2025-05-31 RX ORDER — METHOCARBAMOL 500 MG/1
1000 TABLET, FILM COATED ORAL 3 TIMES DAILY PRN
Qty: 30 TABLET | Refills: 0 | Status: SHIPPED | OUTPATIENT
Start: 2025-05-31

## 2025-07-19 ENCOUNTER — HOSPITAL ENCOUNTER (EMERGENCY)
Facility: HOSPITAL | Age: 55
Discharge: HOME | End: 2025-07-19
Attending: EMERGENCY MEDICINE
Payer: COMMERCIAL

## 2025-07-19 VITALS
TEMPERATURE: 98 F | BODY MASS INDEX: 22.13 KG/M2 | RESPIRATION RATE: 20 BRPM | DIASTOLIC BLOOD PRESSURE: 76 MMHG | HEART RATE: 70 BPM | WEIGHT: 167 LBS | HEIGHT: 73 IN | OXYGEN SATURATION: 99 % | SYSTOLIC BLOOD PRESSURE: 125 MMHG

## 2025-07-19 DIAGNOSIS — R21 RASH OF MULTIPLE DIGITS OF BOTH HANDS: ICD-10-CM

## 2025-07-19 DIAGNOSIS — M54.17 LUMBOSACRAL RADICULOPATHY: ICD-10-CM

## 2025-07-19 DIAGNOSIS — S39.012A LUMBOSACRAL STRAIN, INITIAL ENCOUNTER: ICD-10-CM

## 2025-07-19 DIAGNOSIS — M62.838 MUSCLE SPASM: Primary | ICD-10-CM

## 2025-07-19 PROCEDURE — 99284 EMERGENCY DEPT VISIT MOD MDM: CPT | Mod: 25

## 2025-07-19 PROCEDURE — 96372 THER/PROPH/DIAG INJ SC/IM: CPT | Performed by: NURSE PRACTITIONER

## 2025-07-19 PROCEDURE — 63600175 PHARM REV CODE 636 W HCPCS: Mod: JZ,TB | Performed by: NURSE PRACTITIONER

## 2025-07-19 RX ORDER — METHOCARBAMOL 500 MG/1
500 TABLET, FILM COATED ORAL 3 TIMES DAILY
Qty: 30 TABLET | Refills: 0 | Status: SHIPPED | OUTPATIENT
Start: 2025-07-19 | End: 2025-07-29

## 2025-07-19 RX ORDER — KETOROLAC TROMETHAMINE 30 MG/ML
30 INJECTION, SOLUTION INTRAMUSCULAR; INTRAVENOUS
Status: COMPLETED | OUTPATIENT
Start: 2025-07-19 | End: 2025-07-19

## 2025-07-19 RX ORDER — NAPROXEN 375 MG/1
375 TABLET ORAL 2 TIMES DAILY WITH MEALS
Qty: 20 TABLET | Refills: 0 | Status: SHIPPED | OUTPATIENT
Start: 2025-07-19 | End: 2025-07-29

## 2025-07-19 RX ADMIN — KETOROLAC TROMETHAMINE 30 MG: 60 INJECTION, SOLUTION INTRAMUSCULAR at 04:07

## 2025-07-19 NOTE — ED PROVIDER NOTES
Encounter Date: 7/19/2025       History     Chief Complaint   Patient presents with    Low-back Pain     Left lower back pain radiating down left leg x3-4 days. Denies injury/fall, ambulatory to triage.      54-year-old male with a history of hypertension and sciatica presents with complaints of moderate left low back pain radiating down the left buttock and left lateral leg to the knee x 5-6 months.  Has been seen multiple times in the past for this complaint.  Denies any saddle paresthesias or prior injuries.  States just always does a lot heavy lifting at work.  Denies any lower extremity weakness.  Additional complaint of bilateral hand pruritic rash that has also been occurring for the last 5-6 months.  States he is in contact with a lot of chemicals at work and despite wearing gloves he still breaks out in a rash frequently.  States has been seen for a previously and has used multiple different types  of cream without improvement.    The history is provided by the patient. No  was used.     Review of patient's allergies indicates:  No Known Allergies  Past Medical History:   Diagnosis Date    Hypertension      History reviewed. No pertinent surgical history.  No family history on file.  Social History[1]  Review of Systems   Constitutional:  Negative for fever.   HENT:  Negative for congestion and sore throat.    Respiratory:  Negative for cough, shortness of breath and wheezing.    Cardiovascular:  Negative for chest pain and palpitations.   Gastrointestinal:  Negative for abdominal pain, diarrhea, nausea and vomiting.   Genitourinary:  Negative for dysuria, flank pain, frequency and hematuria.   Musculoskeletal:  Positive for back pain. Negative for gait problem and neck pain.   Skin:  Negative for rash.   Neurological:  Negative for dizziness, weakness and headaches.   Hematological:  Does not bruise/bleed easily.       Physical Exam     Initial Vitals [07/19/25 1527]   BP Pulse Resp  Temp SpO2   125/76 70 20 97.7 °F (36.5 °C) 99 %      MAP       --         Physical Exam    Nursing note and vitals reviewed.  Constitutional: He appears well-developed and well-nourished.   HENT:   Head: Normocephalic and atraumatic.   Eyes: EOM are normal. Pupils are equal, round, and reactive to light.   Neck: Neck supple.   Normal range of motion.  Cardiovascular:  Normal rate, regular rhythm, normal heart sounds and intact distal pulses.           Pulmonary/Chest: Breath sounds normal.   Abdominal: Abdomen is soft. Bowel sounds are normal.   Musculoskeletal:         General: Tenderness present.      Cervical back: Normal range of motion and neck supple.      Comments: Left-sided lumbar paraspinous muscle spasming and tenderness to palpation.  No spinous process tenderness, step-offs, or crepitus.  Negative Galileo's. + L-sided SLR     Neurological: He is alert and oriented to person, place, and time. He has normal strength and normal reflexes. GCS score is 15. GCS eye subscore is 4. GCS verbal subscore is 5. GCS motor subscore is 6.   Skin: Skin is warm and dry. Capillary refill takes less than 2 seconds. Rash noted.   Multiple scattered places on the dorsal and palmar surfaces of both hands that are discolored and scaly   Psychiatric: He has a normal mood and affect. His behavior is normal. Thought content normal.         ED Course   Procedures  Labs Reviewed - No data to display       Imaging Results    None          Medications   ketorolac injection 30 mg (30 mg Intramuscular Given 7/19/25 6904)     Medical Decision Making  54-year-old with history of sciatica presents with complaints of left lower back pain radiating down the buttock and left leg as well as a rash to bilateral hands.  Differentials:  Atopic dermatitis, contact dermatitis, dorsalgia, muscle spasm, lumbosacral radiculopathy, lumbosacral strain    Risk  Prescription drug management.               ED Course as of 07/19/25 1640   Sat Jul 19, 2025    1620 Given strict ED return precautions. I have spoken with the patient and/or caregivers. I have explained the patient's condition, diagnoses and treatment plan based on the information available to me at this time. I have answered the patient's and/or caregiver's questions and addressed any concerns. The patient and/or caregivers have as good an understanding of the patient's diagnosis, condition and treatment plan as can be expected at this point. The vital signs have been stable. The patient's condition is stable and appropriate for discharge from the emergency department.      The patient will pursue further outpatient evaluation with the primary care physician or other designated or consulting physician as outlined in the discharge instructions. The patient and/or caregivers are agreeable to this plan of care and follow-up instructions have been explained in detail. The patient and/or caregivers have received these instructions in written format and have expressed an understanding of the discharge instructions. The patient and/or caregivers are aware that any significant change in condition or worsening of symptoms should prompt an immediate return to this or the closest emergency department or a call to 911.  [FS]      ED Course User Index  [FS] Fauzia Delacruz FNP                               Clinical Impression:  Final diagnoses:  [M62.838] Muscle spasm (Primary)  [S39.012A] Lumbosacral strain, initial encounter  [M54.17] Lumbosacral radiculopathy  [R21] Rash of multiple digits of both hands          ED Disposition Condition    Discharge Stable          ED Prescriptions       Medication Sig Dispense Start Date End Date Auth. Provider    naproxen (NAPROSYN) 375 MG tablet Take 1 tablet (375 mg total) by mouth 2 (two) times daily with meals. for 10 days 20 tablet 7/19/2025 7/29/2025 Fauzia Delacruz FNP    methocarbamoL (ROBAXIN) 500 MG Tab Take 1 tablet (500 mg total) by mouth 3 (three) times daily.  for 10 days 30 tablet 7/19/2025 7/29/2025 Fauzia Delacruz FNP          Follow-up Information       Follow up With Specialties Details Why Contact Info    [facility] Emergency Medicine  If symptoms worsen [address] [facility] [address] [phone]                   [1]   Social History  Tobacco Use    Smoking status: Every Day     Current packs/day: 0.50     Average packs/day: 0.5 packs/day for 38.5 years (19.3 ttl pk-yrs)     Types: Cigarettes     Start date: 1987    Smokeless tobacco: Never   Substance Use Topics    Alcohol use: Never    Drug use: Never        Fauzia Delacruz FNP  07/19/25 8703

## 2025-07-19 NOTE — DISCHARGE INSTRUCTIONS
Apply moist heat 15-20 mins and then perform gentle stretches 3-4 times a day. Follow up with your PCP should symptoms persist or worsen. Take medications as prescribed. Do not drive within 1 hr of taking muscle relaxer.     Follow up with dermatology concerning the chemical related rash on your hands.

## 2025-08-02 ENCOUNTER — HOSPITAL ENCOUNTER (EMERGENCY)
Facility: HOSPITAL | Age: 55
Discharge: HOME | End: 2025-08-02
Attending: INTERNAL MEDICINE
Payer: COMMERCIAL

## 2025-08-02 VITALS
HEIGHT: 73 IN | WEIGHT: 164 LBS | HEART RATE: 61 BPM | BODY MASS INDEX: 21.74 KG/M2 | OXYGEN SATURATION: 99 % | DIASTOLIC BLOOD PRESSURE: 69 MMHG | TEMPERATURE: 98 F | RESPIRATION RATE: 20 BRPM | SYSTOLIC BLOOD PRESSURE: 108 MMHG

## 2025-08-02 DIAGNOSIS — M54.50 CHRONIC BILATERAL LOW BACK PAIN WITHOUT SCIATICA: ICD-10-CM

## 2025-08-02 DIAGNOSIS — F17.200 NEEDS SMOKING CESSATION EDUCATION: ICD-10-CM

## 2025-08-02 DIAGNOSIS — Z86.79 HISTORY OF HYPERTENSION: ICD-10-CM

## 2025-08-02 DIAGNOSIS — Z76.89 ENCOUNTER TO ESTABLISH CARE: ICD-10-CM

## 2025-08-02 DIAGNOSIS — Z76.0 ENCOUNTER FOR MEDICATION REFILL: ICD-10-CM

## 2025-08-02 DIAGNOSIS — G89.29 CHRONIC BILATERAL LOW BACK PAIN WITHOUT SCIATICA: ICD-10-CM

## 2025-08-02 DIAGNOSIS — R21 RASH AND NONSPECIFIC SKIN ERUPTION: Primary | ICD-10-CM

## 2025-08-02 PROCEDURE — 99281 EMR DPT VST MAYX REQ PHY/QHP: CPT

## 2025-08-02 RX ORDER — TRIAMCINOLONE ACETONIDE 1 MG/G
CREAM TOPICAL 2 TIMES DAILY
Qty: 15 G | Refills: 0 | Status: SHIPPED | OUTPATIENT
Start: 2025-08-02 | End: 2025-08-07

## 2025-08-02 RX ORDER — AMLODIPINE BESYLATE 5 MG/1
5 TABLET ORAL DAILY
Qty: 30 TABLET | Refills: 0 | Status: SHIPPED | OUTPATIENT
Start: 2025-08-02 | End: 2025-09-01

## 2025-08-02 RX ORDER — DICLOFENAC SODIUM 75 MG/1
75 TABLET, DELAYED RELEASE ORAL 2 TIMES DAILY PRN
Qty: 20 TABLET | Refills: 0 | Status: SHIPPED | OUTPATIENT
Start: 2025-08-02

## 2025-08-02 RX ORDER — PRAVASTATIN SODIUM 20 MG/1
20 TABLET ORAL NIGHTLY
Qty: 30 TABLET | Refills: 0 | Status: SHIPPED | OUTPATIENT
Start: 2025-08-02 | End: 2025-09-01

## 2025-08-02 NOTE — ED PROVIDER NOTES
Encounter Date: 8/2/2025       History     Chief Complaint   Patient presents with    Rash     Pt has rash/skin condition to left thumb that is spreading to the rest of his hand. Pt reports this problem has been going on for over a year and he has been seen before for this. Pt reports he needs a pcp to get a dermatology consult. He is also requesting refills for his home meds. Vss.      Patient is a 54-year-old male who presents for evaluation of a rash to his left hand.  Admits symptoms have been ongoing for over a year.  Says he has been placed on creams and ointments which have improved; however, the rash then returns.  The patient has tried finding dermatology services for evaluation; however, he does not have a primary care doctor and would like to get established at [facility] and [facility].  Chronic medical conditions include hypertension.  Patient requesting a medication refills while in the emergency room.  Denies headache, dizziness, blurry vision, chest pain, shortness of breath, fever, or loss of bowel or bladder control.  The patient also reports flare chronic back pain.  Denies injury lower back.  This pain has flared over the last 2-3 days and he is requesting medication for this issue as well.      Review of patient's allergies indicates:  No Known Allergies  Past Medical History:   Diagnosis Date    Hypertension      History reviewed. No pertinent surgical history.  No family history on file.  Social History[1]  Review of Systems   Constitutional:  Negative for chills, diaphoresis, fatigue and fever.   HENT:  Negative for facial swelling, postnasal drip, rhinorrhea, sinus pressure, sinus pain, sore throat and trouble swallowing.    Respiratory:  Negative for cough, chest tightness, shortness of breath and wheezing.    Cardiovascular:  Negative for chest pain, palpitations and leg swelling.   Gastrointestinal:  Negative for abdominal pain, diarrhea, nausea and vomiting.   Genitourinary:   Negative for dysuria, flank pain, hematuria and urgency.   Musculoskeletal:  Positive for back pain. Negative for arthralgias and myalgias.   Skin:  Positive for rash. Negative for color change.   Neurological:  Negative for dizziness, syncope, weakness and headaches.   Hematological:  Does not bruise/bleed easily.   All other systems reviewed and are negative.      Physical Exam     Initial Vitals [08/02/25 1629]   BP Pulse Resp Temp SpO2   108/69 61 20 98.2 °F (36.8 °C) 99 %      MAP       --         Physical Exam    Nursing note and vitals reviewed.  Constitutional: Vital signs are normal. He appears well-developed and well-nourished.   HENT:   Head: Normocephalic.   Nose: Nose normal. Mouth/Throat: Oropharynx is clear and moist.   Eyes: Conjunctivae and EOM are normal. Pupils are equal, round, and reactive to light.   Neck: Neck supple.   Normal range of motion.  Cardiovascular:  Normal rate, regular rhythm, normal heart sounds and intact distal pulses.           Pulmonary/Chest: Effort normal and breath sounds normal. No respiratory distress. He has no wheezes. He has no rhonchi. He has no rales. He exhibits no tenderness.   Abdominal: Abdomen is soft and flat. Bowel sounds are normal. There is no abdominal tenderness. There is no rebound, no guarding, no tenderness at McBurney's point and negative Pabon's sign.   Musculoskeletal:         General: Normal range of motion.        Hands:       Cervical back: Normal range of motion and neck supple.      Lumbar back: Tenderness present. No swelling, edema, deformity or lacerations.        Back:      Neurological: He is alert and oriented to person, place, and time. He has normal strength.   Skin: Skin is warm and dry. Capillary refill takes less than 2 seconds.   Psychiatric: He has a normal mood and affect. His behavior is normal. Judgment and thought content normal.         ED Course   Procedures  Labs Reviewed - No data to display       Imaging Results    None           Medications - No data to display  Medical Decision Making  Differential:   Skin rash   Chronic back pain  Psoriasis   Encounter to establish care               ED Course as of 08/02/25 1708   Sat Aug 02, 2025   1705 Given strict ED return precautions. I have spoken with the patient and/or caregivers. I have explained the patient's condition, diagnoses and treatment plan based on the information available to me at this time. I have answered the patient's and/or caregiver's questions and addressed any concerns. The patient and/or caregivers have as good an understanding of the patient's diagnosis, condition and treatment plan as can be expected at this point. The vital signs have been stable. The patient's condition is stable and appropriate for discharge from the emergency department.      The patient will pursue further outpatient evaluation with the primary care physician or other designated or consulting physician as outlined in the discharge instructions. The patient and/or caregivers are agreeable to this plan of care and follow-up instructions have been explained in detail. The patient and/or caregivers have received these instructions in written format and have expressed an understanding of the discharge instructions. The patient and/or caregivers are aware that any significant change in condition or worsening of symptoms should prompt an immediate return to this or the closest emergency department or a call to 911.   [JA]      ED Course User Index  [JA] Charanjit Ortega Jr., DNP                           This chart is generated using a voice recognition system. Grammatical and content areas may inadvertently be generated in error. Please contact me if you find a perceive any inappropriate information in this chart. Thank you.         Clinical Impression:  Final diagnoses:  [R21] Rash and nonspecific skin eruption (Primary)  [Z76.89] Encounter to establish care  [M54.50, G89.29] Chronic bilateral low  back pain without sciatica  [Z76.0] Encounter for medication refill  [Z86.79] History of hypertension          ED Disposition Condition    Discharge Stable          ED Prescriptions       Medication Sig Dispense Start Date End Date Auth. Provider    amLODIPine (NORVASC) 5 MG tablet Take 1 tablet (5 mg total) by mouth once daily. 30 tablet 8/2/2025 9/1/2025 Charanjit Ortega Jr., DNP    triamcinolone acetonide 0.1% (KENALOG) 0.1 % cream Apply topically 2 (two) times daily. for 5 days 15 g 8/2/2025 8/7/2025 Charanjit Ortega Jr., DNP    pravastatin (PRAVACHOL) 20 MG tablet Take 1 tablet (20 mg total) by mouth every evening. 30 tablet 8/2/2025 9/1/2025 Charanjit Ortega Jr., DNP    diclofenac (VOLTAREN) 75 MG EC tablet Take 1 tablet (75 mg total) by mouth 2 (two) times daily as needed (pain). Don't take with ibuprofen, motrin, aleve, naprosyn or any other NSAID. 20 tablet 8/2/2025 -- Charanjit Ortega Jr., DNP          Follow-up Information       Follow up With Specialties Details Why Contact Info    [facility] - Emergency Dept Emergency Medicine In 3 days As needed, If symptoms worsen [address] [place] [id number] [phone]    [facility]  Schedule an appointment as soon as possible for a visit in 1 week Follow up with [facility] to obtain a PCP [address] [place] [phone]                   [1]   Social History  Tobacco Use    Smoking status: Every Day     Current packs/day: 0.50     Average packs/day: 0.5 packs/day for 38.6 years (19.3 ttl pk-yrs)     Types: Cigarettes     Start date: 1987    Smokeless tobacco: Never   Substance Use Topics    Alcohol use: Never    Drug use: Never        Charanjit Ortega Jr., DNP  08/02/25 5694